# Patient Record
Sex: FEMALE | HISPANIC OR LATINO | Employment: FULL TIME | URBAN - METROPOLITAN AREA
[De-identification: names, ages, dates, MRNs, and addresses within clinical notes are randomized per-mention and may not be internally consistent; named-entity substitution may affect disease eponyms.]

---

## 2018-07-20 ENCOUNTER — OFFICE VISIT (OUTPATIENT)
Dept: FAMILY MEDICINE CLINIC | Facility: CLINIC | Age: 48
End: 2018-07-20

## 2018-07-20 VITALS
DIASTOLIC BLOOD PRESSURE: 80 MMHG | SYSTOLIC BLOOD PRESSURE: 116 MMHG | BODY MASS INDEX: 31.95 KG/M2 | HEART RATE: 76 BPM | HEIGHT: 63 IN | WEIGHT: 180.3 LBS | TEMPERATURE: 98.4 F | OXYGEN SATURATION: 98 %

## 2018-07-20 DIAGNOSIS — Z12.39 BREAST CANCER SCREENING: ICD-10-CM

## 2018-07-20 DIAGNOSIS — Z00.00 ROUTINE GENERAL MEDICAL EXAMINATION AT A HEALTH CARE FACILITY: Primary | ICD-10-CM

## 2018-07-20 DIAGNOSIS — K59.00 CONSTIPATION, UNSPECIFIED CONSTIPATION TYPE: ICD-10-CM

## 2018-07-20 DIAGNOSIS — Z13.1 SCREENING FOR DIABETES MELLITUS: ICD-10-CM

## 2018-07-20 DIAGNOSIS — Z13.6 SCREENING FOR ISCHEMIC HEART DISEASE: ICD-10-CM

## 2018-07-20 PROCEDURE — 99203 OFFICE O/P NEW LOW 30 MIN: CPT | Performed by: NURSE PRACTITIONER

## 2018-07-20 PROCEDURE — 99386 PREV VISIT NEW AGE 40-64: CPT | Performed by: NURSE PRACTITIONER

## 2018-07-20 NOTE — PROGRESS NOTES
"Subjective   Filomena Melendez is a 47 y.o. female.     History of Present Illness   Filomena Melendez 47 y.o. female who presents today for a new patient appointment.    she has a history of   Patient Active Problem List   Diagnosis   • Routine general medical examination at a health care facility   • Breast cancer screening   • Constipation   .  she is here to establish care I reviewed the PFSH recorded today by my MA/LPN staff.   she   She has been feeling well.    Well Adult Physical: Patient here for a comprehensive physical exam.The patient reports constipation  Do you take any herbs or supplements that were not prescribed by a doctor? no Are you taking calcium supplements? no Are you taking aspirin daily? no    Patient has been doing with constipation for a very long time she uses over-the-counter fiber and Metamucil and nothing seems to help.  She has tried to stay away from laxatives.  She usually has a bowel movement every other day but very hard pebbly.    The following portions of the patient's history were reviewed and updated as appropriate: allergies, current medications, past social history and problem list.    Review of Systems   Constitutional: Negative for fever.   All other systems reviewed and are negative.      Objective   /80 (BP Location: Left arm, Patient Position: Sitting)   Pulse 76   Temp 98.4 °F (36.9 °C)   Ht 160 cm (62.99\")   Wt 81.8 kg (180 lb 4.8 oz)   SpO2 98%   BMI 31.95 kg/m²   Physical Exam   Constitutional: She is oriented to person, place, and time. She appears well-developed and well-nourished. No distress.   HENT:   Head: Normocephalic and atraumatic. Hair is normal.   Right Ear: Hearing, tympanic membrane, external ear and ear canal normal. No drainage. No decreased hearing is noted.   Left Ear: Hearing, tympanic membrane, external ear and ear canal normal. No decreased hearing is noted.   Nose: No nasal deformity.   Mouth/Throat: Oropharynx is clear and moist.   Eyes: Pupils " are equal, round, and reactive to light. Conjunctivae, EOM and lids are normal. Right eye exhibits no discharge. Left eye exhibits no discharge.   Neck: Normal range of motion. Neck supple. No JVD present. No tracheal deviation present. No thyromegaly present.   Cardiovascular: Normal rate, regular rhythm, normal heart sounds, intact distal pulses and normal pulses.  Exam reveals no gallop and no friction rub.    No murmur heard.  Pulmonary/Chest: Effort normal and breath sounds normal. No respiratory distress. She has no wheezes. She has no rales. She exhibits no tenderness.   Abdominal: Soft. Bowel sounds are normal. She exhibits no distension and no mass. There is no tenderness. There is no rebound and no guarding. No hernia.   Musculoskeletal: Normal range of motion. She exhibits no edema, tenderness or deformity.   Lymphadenopathy:     She has no cervical adenopathy.   Neurological: She is alert and oriented to person, place, and time. She has normal reflexes. She displays normal reflexes. No cranial nerve deficit. She exhibits normal muscle tone. Coordination normal.   Skin: Skin is warm and dry. No rash noted. She is not diaphoretic. No erythema.   Psychiatric: She has a normal mood and affect. Her behavior is normal. Judgment and thought content normal.   Vitals reviewed.      Assessment/Plan   Problem List Items Addressed This Visit        Digestive    Constipation    Relevant Medications    linaclotide (LINZESS) 72 MCG capsule capsule       Other    Routine general medical examination at a health care facility - Primary    Breast cancer screening    Relevant Orders    Mammo Screening Bilateral With CAD        Discussed weight, diet and exercise  Follow up after labs

## 2018-07-24 LAB
ALBUMIN SERPL-MCNC: 4.3 G/DL (ref 3.5–5.2)
ALBUMIN/GLOB SERPL: 1.5 G/DL
ALP SERPL-CCNC: 141 U/L (ref 39–117)
ALT SERPL-CCNC: 29 U/L (ref 1–33)
AST SERPL-CCNC: 24 U/L (ref 1–32)
BILIRUB SERPL-MCNC: 0.3 MG/DL (ref 0.1–1.2)
BUN SERPL-MCNC: 14 MG/DL (ref 6–20)
BUN/CREAT SERPL: 15.7 (ref 7–25)
CALCIUM SERPL-MCNC: 9.5 MG/DL (ref 8.6–10.5)
CHLORIDE SERPL-SCNC: 106 MMOL/L (ref 98–107)
CHOLEST SERPL-MCNC: 143 MG/DL (ref 0–200)
CO2 SERPL-SCNC: 25.3 MMOL/L (ref 22–29)
CREAT SERPL-MCNC: 0.89 MG/DL (ref 0.57–1)
GLOBULIN SER CALC-MCNC: 2.9 GM/DL
GLUCOSE SERPL-MCNC: 100 MG/DL (ref 65–99)
HDLC SERPL-MCNC: 46 MG/DL (ref 40–60)
LDLC SERPL CALC-MCNC: 82 MG/DL (ref 0–100)
LDLC/HDLC SERPL: 1.79 {RATIO}
POTASSIUM SERPL-SCNC: 4.5 MMOL/L (ref 3.5–5.2)
PROT SERPL-MCNC: 7.2 G/DL (ref 6–8.5)
SODIUM SERPL-SCNC: 142 MMOL/L (ref 136–145)
TRIGL SERPL-MCNC: 74 MG/DL (ref 0–150)
VLDLC SERPL CALC-MCNC: 14.8 MG/DL (ref 5–40)

## 2018-07-30 ENCOUNTER — HOSPITAL ENCOUNTER (OUTPATIENT)
Dept: MAMMOGRAPHY | Facility: HOSPITAL | Age: 48
Discharge: HOME | End: 2018-07-30
Admitting: NURSE PRACTITIONER

## 2018-07-30 PROCEDURE — 77067 SCR MAMMO BI INCL CAD: CPT

## 2021-09-23 ENCOUNTER — OFFICE VISIT (OUTPATIENT)
Dept: FAMILY MEDICINE CLINIC | Facility: CLINIC | Age: 51
End: 2021-09-23

## 2021-09-23 VITALS
SYSTOLIC BLOOD PRESSURE: 130 MMHG | WEIGHT: 177 LBS | BODY MASS INDEX: 30.22 KG/M2 | DIASTOLIC BLOOD PRESSURE: 80 MMHG | HEIGHT: 64 IN | OXYGEN SATURATION: 98 % | TEMPERATURE: 98 F | HEART RATE: 80 BPM

## 2021-09-23 DIAGNOSIS — Z12.31 ENCOUNTER FOR SCREENING MAMMOGRAM FOR MALIGNANT NEOPLASM OF BREAST: ICD-10-CM

## 2021-09-23 DIAGNOSIS — Z12.11 COLON CANCER SCREENING: ICD-10-CM

## 2021-09-23 DIAGNOSIS — Z13.0 SCREENING FOR IRON DEFICIENCY ANEMIA: ICD-10-CM

## 2021-09-23 DIAGNOSIS — Z00.00 ROUTINE GENERAL MEDICAL EXAMINATION AT A HEALTH CARE FACILITY: Primary | ICD-10-CM

## 2021-09-23 DIAGNOSIS — Z13.6 SCREENING FOR ISCHEMIC HEART DISEASE: ICD-10-CM

## 2021-09-23 DIAGNOSIS — Z13.1 SCREENING FOR DIABETES MELLITUS: ICD-10-CM

## 2021-09-23 LAB
ALBUMIN SERPL-MCNC: 4.5 G/DL (ref 3.5–5.2)
ALBUMIN/GLOB SERPL: 1.5 G/DL
ALP SERPL-CCNC: 158 U/L (ref 39–117)
ALT SERPL-CCNC: 42 U/L (ref 1–33)
AST SERPL-CCNC: 38 U/L (ref 1–32)
BASOPHILS # BLD AUTO: 0.05 10*3/MM3 (ref 0–0.2)
BASOPHILS NFR BLD AUTO: 0.8 % (ref 0–1.5)
BILIRUB SERPL-MCNC: 0.2 MG/DL (ref 0–1.2)
BUN SERPL-MCNC: 10 MG/DL (ref 6–20)
BUN/CREAT SERPL: 13.2 (ref 7–25)
CALCIUM SERPL-MCNC: 9.5 MG/DL (ref 8.6–10.5)
CHLORIDE SERPL-SCNC: 107 MMOL/L (ref 98–107)
CHOLEST SERPL-MCNC: 181 MG/DL (ref 0–200)
CO2 SERPL-SCNC: 26.7 MMOL/L (ref 22–29)
CREAT SERPL-MCNC: 0.76 MG/DL (ref 0.57–1)
EOSINOPHIL # BLD AUTO: 0.16 10*3/MM3 (ref 0–0.4)
EOSINOPHIL NFR BLD AUTO: 2.5 % (ref 0.3–6.2)
ERYTHROCYTE [DISTWIDTH] IN BLOOD BY AUTOMATED COUNT: 13.3 % (ref 12.3–15.4)
GLOBULIN SER CALC-MCNC: 3.1 GM/DL
GLUCOSE SERPL-MCNC: 96 MG/DL (ref 65–99)
HCT VFR BLD AUTO: 40.8 % (ref 34–46.6)
HDLC SERPL-MCNC: 54 MG/DL (ref 40–60)
HGB BLD-MCNC: 13 G/DL (ref 12–15.9)
IMM GRANULOCYTES # BLD AUTO: 0.02 10*3/MM3 (ref 0–0.05)
IMM GRANULOCYTES NFR BLD AUTO: 0.3 % (ref 0–0.5)
LDLC SERPL CALC-MCNC: 108 MG/DL (ref 0–100)
LDLC/HDLC SERPL: 1.95 {RATIO}
LYMPHOCYTES # BLD AUTO: 1.81 10*3/MM3 (ref 0.7–3.1)
LYMPHOCYTES NFR BLD AUTO: 28.5 % (ref 19.6–45.3)
MCH RBC QN AUTO: 27.3 PG (ref 26.6–33)
MCHC RBC AUTO-ENTMCNC: 31.9 G/DL (ref 31.5–35.7)
MCV RBC AUTO: 85.7 FL (ref 79–97)
MONOCYTES # BLD AUTO: 0.49 10*3/MM3 (ref 0.1–0.9)
MONOCYTES NFR BLD AUTO: 7.7 % (ref 5–12)
NEUTROPHILS # BLD AUTO: 3.81 10*3/MM3 (ref 1.7–7)
NEUTROPHILS NFR BLD AUTO: 60.2 % (ref 42.7–76)
NRBC BLD AUTO-RTO: 0 /100 WBC (ref 0–0.2)
PLATELET # BLD AUTO: 304 10*3/MM3 (ref 140–450)
POTASSIUM SERPL-SCNC: 4.4 MMOL/L (ref 3.5–5.2)
PROT SERPL-MCNC: 7.6 G/DL (ref 6–8.5)
RBC # BLD AUTO: 4.76 10*6/MM3 (ref 3.77–5.28)
SODIUM SERPL-SCNC: 141 MMOL/L (ref 136–145)
TRIGL SERPL-MCNC: 108 MG/DL (ref 0–150)
VLDLC SERPL CALC-MCNC: 19 MG/DL (ref 5–40)
WBC # BLD AUTO: 6.34 10*3/MM3 (ref 3.4–10.8)

## 2021-09-23 PROCEDURE — 99396 PREV VISIT EST AGE 40-64: CPT | Performed by: NURSE PRACTITIONER

## 2021-09-23 NOTE — PROGRESS NOTES
"Chief Complaint  Annual Exam (Patient is fasting. Patient is needing order for mammogram ( wore mask and goggles))    Odilon Melendez presents to [facility]  History of Present Illness  Well Adult Physical: Patient here for a comprehensive physical exam.The patient reports no problems  Do you take any herbs or supplements that were not prescribed by a doctor? no Are you taking calcium supplements? no Are you taking aspirin daily? no      Objective   Vital Signs:   /80   Pulse 80   Temp 98 °F (36.7 °C)   Ht 162.6 cm (64\")   Wt 80.3 kg (177 lb)   SpO2 98%   BMI 30.38 kg/m²     Physical Exam  Vitals reviewed.   Constitutional:       General: She is not in acute distress.     Appearance: She is well-developed. She is not diaphoretic.   HENT:      Head: Normocephalic and atraumatic. Hair is normal.      Right Ear: Hearing, tympanic membrane, ear canal and external ear normal. No decreased hearing noted. No drainage.      Left Ear: Hearing, tympanic membrane, ear canal and external ear normal. No decreased hearing noted.      Nose: No nasal deformity.   Eyes:      General: Lids are normal.         Right eye: No discharge.         Left eye: No discharge.      Conjunctiva/sclera: Conjunctivae normal.      Pupils: Pupils are equal, round, and reactive to light.   Neck:      Thyroid: No thyromegaly.      Vascular: No JVD.      Trachea: No tracheal deviation.   Cardiovascular:      Rate and Rhythm: Normal rate and regular rhythm.      Pulses: Normal pulses.      Heart sounds: Normal heart sounds. No murmur heard.   No friction rub. No gallop.    Pulmonary:      Effort: Pulmonary effort is normal. No respiratory distress.      Breath sounds: Normal breath sounds. No wheezing or rales.   Chest:      Chest wall: No tenderness.   Abdominal:      General: Bowel sounds are normal. There is no distension.      Palpations: Abdomen is soft. There is no mass.      Tenderness: " There is no abdominal tenderness. There is no guarding or rebound.      Hernia: No hernia is present.   Musculoskeletal:         General: No tenderness or deformity. Normal range of motion.      Cervical back: Normal range of motion and neck supple.   Lymphadenopathy:      Cervical: No cervical adenopathy.   Skin:     General: Skin is warm and dry.      Findings: No erythema or rash.   Neurological:      Mental Status: She is alert and oriented to person, place, and time.      Cranial Nerves: No cranial nerve deficit.      Motor: No abnormal muscle tone.      Coordination: Coordination normal.      Deep Tendon Reflexes: Reflexes are normal and symmetric. Reflexes normal.   Psychiatric:         Behavior: Behavior normal.         Thought Content: Thought content normal.         Judgment: Judgment normal.        Result Review :   The following data was reviewed by: JESSICA Mijares on 09/23/2021:                              Assessment and Plan    Diagnoses and all orders for this visit:    1. Routine general medical examination at a health care facility (Primary)    2. Screening for iron deficiency anemia  -     CBC & Differential    3. Screening for diabetes mellitus  -     Comprehensive Metabolic Panel    4. Screening for ischemic heart disease  -     Lipid Panel With LDL / HDL Ratio    5. Encounter for screening mammogram for malignant neoplasm of breast  -     Mammo Screening Bilateral With CAD    6. Colon cancer screening  -     Cologuard - Stool, Per Rectum; Future        Follow Up   Return if symptoms worsen or fail to improve.  Patient was given instructions and counseling regarding her condition or for health maintenance advice. Please see specific information pulled into the AVS if appropriate.     Discussed weight, diet and exercise  Follow up after labs    Mask and googles worn

## 2021-09-24 ENCOUNTER — TRANSCRIBE ORDERS (OUTPATIENT)
Dept: ADMINISTRATIVE | Facility: HOSPITAL | Age: 51
End: 2021-09-24

## 2021-09-24 DIAGNOSIS — Z12.31 SCREENING MAMMOGRAM, ENCOUNTER FOR: Primary | ICD-10-CM

## 2021-09-24 DIAGNOSIS — R79.89 ELEVATED LFTS: Primary | ICD-10-CM

## 2021-10-08 DIAGNOSIS — R79.89 ELEVATED LFTS: ICD-10-CM

## 2021-10-14 LAB
ALBUMIN SERPL-MCNC: 4.6 G/DL (ref 3.5–5.2)
ALBUMIN/GLOB SERPL: 1.5 G/DL
ALP SERPL-CCNC: 145 U/L (ref 39–117)
ALT SERPL-CCNC: 22 U/L (ref 1–33)
AST SERPL-CCNC: 25 U/L (ref 1–32)
BILIRUB SERPL-MCNC: 0.3 MG/DL (ref 0–1.2)
BUN SERPL-MCNC: 11 MG/DL (ref 6–20)
BUN/CREAT SERPL: 13.6 (ref 7–25)
CALCIUM SERPL-MCNC: 9.6 MG/DL (ref 8.6–10.5)
CHLORIDE SERPL-SCNC: 105 MMOL/L (ref 98–107)
CO2 SERPL-SCNC: 26.1 MMOL/L (ref 22–29)
CREAT SERPL-MCNC: 0.81 MG/DL (ref 0.57–1)
GLOBULIN SER CALC-MCNC: 3.1 GM/DL
GLUCOSE SERPL-MCNC: 90 MG/DL (ref 65–99)
HAV IGM SERPL QL IA: NEGATIVE
HBV CORE IGM SERPL QL IA: NEGATIVE
HBV SURFACE AG SERPL QL IA: NEGATIVE
HCV AB S/CO SERPL IA: 0.1 S/CO RATIO (ref 0–0.9)
POTASSIUM SERPL-SCNC: 4.2 MMOL/L (ref 3.5–5.2)
PROT SERPL-MCNC: 7.7 G/DL (ref 6–8.5)
SODIUM SERPL-SCNC: 140 MMOL/L (ref 136–145)

## 2022-09-27 ENCOUNTER — OFFICE VISIT (OUTPATIENT)
Dept: FAMILY MEDICINE CLINIC | Facility: CLINIC | Age: 52
End: 2022-09-27

## 2022-09-27 VITALS
OXYGEN SATURATION: 98 % | RESPIRATION RATE: 16 BRPM | HEIGHT: 64 IN | DIASTOLIC BLOOD PRESSURE: 100 MMHG | HEART RATE: 70 BPM | TEMPERATURE: 97.1 F | WEIGHT: 189.8 LBS | SYSTOLIC BLOOD PRESSURE: 122 MMHG | BODY MASS INDEX: 32.4 KG/M2

## 2022-09-27 DIAGNOSIS — E66.09 CLASS 1 OBESITY DUE TO EXCESS CALORIES WITH SERIOUS COMORBIDITY AND BODY MASS INDEX (BMI) OF 32.0 TO 32.9 IN ADULT: ICD-10-CM

## 2022-09-27 DIAGNOSIS — E78.5 DYSLIPIDEMIA: ICD-10-CM

## 2022-09-27 DIAGNOSIS — Z23 NEED FOR TDAP VACCINATION: ICD-10-CM

## 2022-09-27 DIAGNOSIS — Z00.00 ENCOUNTER FOR ANNUAL PHYSICAL EXAM: Primary | ICD-10-CM

## 2022-09-27 DIAGNOSIS — R03.0 ELEVATED BLOOD-PRESSURE READING WITHOUT DIAGNOSIS OF HYPERTENSION: ICD-10-CM

## 2022-09-27 DIAGNOSIS — R74.8 ALKALINE PHOSPHATASE ELEVATION: ICD-10-CM

## 2022-09-27 DIAGNOSIS — Z12.11 SCREENING FOR COLON CANCER: ICD-10-CM

## 2022-09-27 DIAGNOSIS — R68.89 COLD INTOLERANCE: ICD-10-CM

## 2022-09-27 LAB
BILIRUB BLD-MCNC: NEGATIVE MG/DL
CLARITY, POC: CLEAR
COLOR UR: YELLOW
EXPIRATION DATE: ABNORMAL
GLUCOSE UR STRIP-MCNC: NEGATIVE MG/DL
KETONES UR QL: NEGATIVE
LEUKOCYTE EST, POC: ABNORMAL
Lab: ABNORMAL
NITRITE UR-MCNC: NEGATIVE MG/ML
PH UR: 6.5 [PH] (ref 5–8)
PROT UR STRIP-MCNC: NEGATIVE MG/DL
RBC # UR STRIP: NEGATIVE /UL
SP GR UR: 1.02 (ref 1–1.03)
UROBILINOGEN UR QL: ABNORMAL

## 2022-09-27 PROCEDURE — 90715 TDAP VACCINE 7 YRS/> IM: CPT | Performed by: FAMILY MEDICINE

## 2022-09-27 PROCEDURE — 99214 OFFICE O/P EST MOD 30 MIN: CPT | Performed by: FAMILY MEDICINE

## 2022-09-27 PROCEDURE — 90471 IMMUNIZATION ADMIN: CPT | Performed by: FAMILY MEDICINE

## 2022-09-27 PROCEDURE — 99396 PREV VISIT EST AGE 40-64: CPT | Performed by: FAMILY MEDICINE

## 2022-09-27 PROCEDURE — 81003 URINALYSIS AUTO W/O SCOPE: CPT | Performed by: FAMILY MEDICINE

## 2022-09-27 RX ORDER — NAPROXEN SODIUM 220 MG
220 TABLET ORAL 2 TIMES DAILY PRN
COMMUNITY
End: 2023-04-04

## 2022-09-27 NOTE — PROGRESS NOTES
Patient Care Team:  Brian Durham MD as PCP - General (Urgent Care)     Chief complaint: Patient is in today for a physical          Patient is a 52 y.o. female who presents for her yearly physical exam.     HPI      Doing well, eats HD,trys to do ex  Scheduled for Mammogram and PAP  Her BP elveated , rechecked better but still above goal 122/98. No h/o HTN  No h/o thyroid dis , has cold intolerance   Labs been reviewed showed mild elevation in LDL and Alk phos.  she eats HD and tries to lose wt. She works from home , not doing much excer  Dose not drinks alcohol nor smoking     Lab Results   Component Value Date    GLUCOSE 90 10/13/2021    BUN 11 10/13/2021    CREATININE 0.81 10/13/2021    EGFRIFNONA 75 10/13/2021    EGFRIFAFRI 91 10/13/2021    BCR 13.6 10/13/2021    K 4.2 10/13/2021    CO2 26.1 10/13/2021    CALCIUM 9.6 10/13/2021    PROTENTOTREF 7.7 10/13/2021    ALBUMIN 4.60 10/13/2021    LABIL2 1.5 10/13/2021    AST 25 10/13/2021    ALT 22 10/13/2021     Lab Results   Component Value Date    CHLPL 181 09/23/2021    TRIG 108 09/23/2021    HDL 54 09/23/2021     (H) 09/23/2021         Health maintenance/lifestyle:  Immunization History   Administered Date(s) Administered   • Tdap 09/27/2022     ·  vaccine series and plans on getting the booster shot       PHQ-2 Depression Screening  Little interest or pleasure in doing things? 0-->not at all   Feeling down, depressed, or hopeless? 0-->not at all   PHQ-2 Total Score 0         Social History     Tobacco Use   Smoking Status Never Smoker   Smokeless Tobacco Never Used     Social History     Substance and Sexual Activity   Alcohol Use No         Review of Systems   Constitutional: Negative for activity change, appetite change and diaphoresis.   Respiratory: Negative for cough, choking and shortness of breath.    Cardiovascular: Negative for chest pain, palpitations and leg swelling.   Gastrointestinal: Negative for abdominal pain.   Endocrine: Positive for  "cold intolerance.         History  Past Medical History:   Diagnosis Date   • Allergic    • Arthritis 2015      Past Surgical History:   Procedure Laterality Date   • ABDOMINAL SURGERY     • COSMETIC SURGERY  2014    Tummy Tuck   • FOOT SURGERY     • TUBAL ABDOMINAL LIGATION        Allergies   Allergen Reactions   • Penicillins Rash      Family History   Problem Relation Age of Onset   • Heart disease Mother    • Arthritis Mother    • Asthma Mother    • Depression Mother    • Diabetes Mother    • Hyperlipidemia Mother    • Stroke Mother    • Cancer Maternal Aunt         breast cancer      Social History     Socioeconomic History   • Marital status:    Tobacco Use   • Smoking status: Never Smoker   • Smokeless tobacco: Never Used   Substance and Sexual Activity   • Alcohol use: No   • Drug use: Never   • Sexual activity: Yes     Partners: Male     Birth control/protection: None        Current Outpatient Medications:   •  naproxen sodium (ALEVE) 220 MG tablet, Take 220 mg by mouth 2 (Two) Times a Day As Needed., Disp: , Rfl:                   /100   Pulse 70   Temp 97.1 °F (36.2 °C)   Resp 16   Ht 162.6 cm (64\")   Wt 86.1 kg (189 lb 12.8 oz)   SpO2 98%   BMI 32.58 kg/m²       Physical Exam  Vitals and nursing note reviewed.   Constitutional:       General: She is not in acute distress.     Appearance: She is well-developed. She is obese. She is not ill-appearing, toxic-appearing or diaphoretic.   HENT:      Head: Normocephalic.      Right Ear: Tympanic membrane, ear canal and external ear normal.      Left Ear: Tympanic membrane, ear canal and external ear normal.      Nose: No congestion or rhinorrhea.      Mouth/Throat:      Mouth: Mucous membranes are moist.      Pharynx: Oropharynx is clear. No oropharyngeal exudate or posterior oropharyngeal erythema.   Eyes:      General: No scleral icterus.        Right eye: No discharge.         Left eye: No discharge.      Extraocular Movements: " Extraocular movements intact.      Conjunctiva/sclera: Conjunctivae normal.      Pupils: Pupils are equal, round, and reactive to light.   Neck:      Thyroid: No thyromegaly.      Comments: No enlarged thyroid    Cardiovascular:      Rate and Rhythm: Normal rate and regular rhythm.      Heart sounds: Normal heart sounds. No murmur heard.    No friction rub. No gallop.   Pulmonary:      Effort: Pulmonary effort is normal. No respiratory distress.      Breath sounds: Normal breath sounds. No stridor. No wheezing, rhonchi or rales.   Abdominal:      General: Bowel sounds are normal. There is no distension.      Palpations: Abdomen is soft. There is no mass.      Tenderness: There is no abdominal tenderness. There is no guarding or rebound.      Hernia: No hernia is present.   Musculoskeletal:         General: Normal range of motion.      Cervical back: Neck supple.   Skin:     General: Skin is warm.      Findings: No rash.   Neurological:      General: No focal deficit present.      Mental Status: She is alert and oriented to person, place, and time.      Cranial Nerves: No cranial nerve deficit.      Sensory: No sensory deficit.      Motor: No weakness or abnormal muscle tone.      Coordination: Coordination normal.      Gait: Gait normal.      Deep Tendon Reflexes: Reflexes normal.   Psychiatric:         Mood and Affect: Mood normal.         Behavior: Behavior normal.         Thought Content: Thought content normal.         Judgment: Judgment normal.                   Diagnoses and all orders for this visit:    1. Encounter for annual physical exam (Primary)  -     POC Urinalysis Dipstick, Automated  -     CBC (No Diff)  -     Comprehensive Metabolic Panel    2. Screening for colon cancer  -     Cologuard - Stool, Per Rectum; Future    3. Dyslipidemia  -     Lipid Panel  Encouraged patient to maintain a low cholesterol/DASH diet, increase aerobic exercise as tolerated, decrease alcohol, stop smoking if applicable,  increase fiber intake, limit sodium intake to no more than 1,500mg/day, increase omega-3 fatty acids, and maintain medication compliance.     4. Cold intolerance  -     TSH Rfx On Abnormal To Free T4      5. Elevated blood-pressure reading without diagnosis of hypertension;  - New problem  - Close BP monitoring and f/u , BP login form provided   Encouraged patient to maintain a heart healthy diet/DASH diet, exercise as tolerated, limit sodium intake to no more than 1,500mg/day, limit alcohol intake, maintain medication compliance and practice relaxation techniques to reduce stress.      6. Alkaline phosphatase elevation;  - Elevated Since 2018    - Will recheck it today, if still elevated will refer to GI  For further evaluation     7. Obesity with BMI 32.6;  - Patient's (Body mass index is 32.58 kg/m².) indicates that they are obese (BMI >30) with health related conditions that include hypertension and dyslipidemias . Weight is newly identified. BMI is is above average; no BMI management plan is appropriate. We discussed portion control and increasing exercise.       8. Need for Tdap vaccination  -     Tdap Vaccine Greater Than or Equal To 8yo IM    Addendum 9/28;  Labs reviewed showed ;  1.mild elevation in TSH , will closely monitor it, will recheck it next OV  2. Still elevated alk phos, referral to GI done     Lab Results   Component Value Date    TSH 4.270 (H) 09/27/2022     Lab Results   Component Value Date    ALKPHOS 150 (H) 09/27/2022           Discussed with pt; Regular exercise, healthy diet. Calcium intake, Sunscreen use encouraged.     shingrix discussed -  can check at pharmacy since we do not have     Follow up: Return in about 4 weeks (around 10/25/2022) for follow up on current illness.  Plan of care discussed with pt. They verbalized understanding and agreement.     Brian Durham MD   9/27/2022   10:45 EDT

## 2022-09-28 LAB
ALBUMIN SERPL-MCNC: 4.4 G/DL (ref 3.5–5.2)
ALBUMIN/GLOB SERPL: 1.5 G/DL
ALP SERPL-CCNC: 150 U/L (ref 39–117)
ALT SERPL-CCNC: 23 U/L (ref 1–33)
AST SERPL-CCNC: 27 U/L (ref 1–32)
BILIRUB SERPL-MCNC: 0.2 MG/DL (ref 0–1.2)
BUN SERPL-MCNC: 11 MG/DL (ref 6–20)
BUN/CREAT SERPL: 13.8 (ref 7–25)
CALCIUM SERPL-MCNC: 9.1 MG/DL (ref 8.6–10.5)
CHLORIDE SERPL-SCNC: 105 MMOL/L (ref 98–107)
CHOLEST SERPL-MCNC: 184 MG/DL (ref 0–200)
CO2 SERPL-SCNC: 26.7 MMOL/L (ref 22–29)
CREAT SERPL-MCNC: 0.8 MG/DL (ref 0.57–1)
EGFRCR SERPLBLD CKD-EPI 2021: 89.3 ML/MIN/1.73
ERYTHROCYTE [DISTWIDTH] IN BLOOD BY AUTOMATED COUNT: 13.4 % (ref 12.3–15.4)
GLOBULIN SER CALC-MCNC: 3 GM/DL
GLUCOSE SERPL-MCNC: 93 MG/DL (ref 65–99)
HCT VFR BLD AUTO: 39.2 % (ref 34–46.6)
HDLC SERPL-MCNC: 54 MG/DL (ref 40–60)
HGB BLD-MCNC: 12.5 G/DL (ref 12–15.9)
LDLC SERPL CALC-MCNC: 110 MG/DL (ref 0–100)
MCH RBC QN AUTO: 26.8 PG (ref 26.6–33)
MCHC RBC AUTO-ENTMCNC: 31.9 G/DL (ref 31.5–35.7)
MCV RBC AUTO: 83.9 FL (ref 79–97)
PLATELET # BLD AUTO: 308 10*3/MM3 (ref 140–450)
POTASSIUM SERPL-SCNC: 4.3 MMOL/L (ref 3.5–5.2)
PROT SERPL-MCNC: 7.4 G/DL (ref 6–8.5)
RBC # BLD AUTO: 4.67 10*6/MM3 (ref 3.77–5.28)
SODIUM SERPL-SCNC: 142 MMOL/L (ref 136–145)
TRIGL SERPL-MCNC: 110 MG/DL (ref 0–150)
TSH SERPL DL<=0.005 MIU/L-ACNC: 4.27 UIU/ML (ref 0.27–4.2)
VLDLC SERPL CALC-MCNC: 20 MG/DL (ref 5–40)
WBC # BLD AUTO: 6.35 10*3/MM3 (ref 3.4–10.8)

## 2022-10-17 ENCOUNTER — OFFICE VISIT (OUTPATIENT)
Dept: GASTROENTEROLOGY | Facility: CLINIC | Age: 52
End: 2022-10-17

## 2022-10-17 VITALS
SYSTOLIC BLOOD PRESSURE: 129 MMHG | HEIGHT: 64 IN | HEART RATE: 83 BPM | BODY MASS INDEX: 32.62 KG/M2 | DIASTOLIC BLOOD PRESSURE: 85 MMHG | TEMPERATURE: 95 F | WEIGHT: 191.1 LBS

## 2022-10-17 DIAGNOSIS — R74.8 ELEVATED ALKALINE PHOSPHATASE LEVEL: Primary | ICD-10-CM

## 2022-10-17 PROCEDURE — 99203 OFFICE O/P NEW LOW 30 MIN: CPT | Performed by: INTERNAL MEDICINE

## 2022-10-17 NOTE — PROGRESS NOTES
Chief Complaint   Patient presents with   • elevated alkaline phosphtase     Subjective   HPI  Filomena Melendez is a 52 y.o. female who presents today for new patient evaluation.  She is been referred for evaluation of an elevated alkaline phosphatase.  This is peers to be stable dating back to at least 2018.  She is typically had normal AST and ALT she did have a very mild elevation previously but was normal on most recent check.  Total bilirubin is normal.  BMI today is 32.8 she reports some gradual weight gain over the last few years.  She has no family history of liver disease.  She still has an intact gallbladder and has no complaints of biliary colic.  She does not consume alcohol.  Her primary care physician is ordered a Cologuard which the patient has the kit at home but has not yet completed.    Objective   Vitals:    10/17/22 1323   BP: 129/85   Pulse: 83   Temp: 95 °F (35 °C)     Physical Exam  Vitals reviewed.   Constitutional:       Appearance: She is well-developed.   HENT:      Head: Normocephalic and atraumatic.   Abdominal:      General: Bowel sounds are normal. There is no distension.      Palpations: Abdomen is soft. There is no mass.      Tenderness: There is no abdominal tenderness.      Hernia: No hernia is present.   Skin:     General: Skin is warm and dry.   Neurological:      Mental Status: She is alert and oriented to person, place, and time.   Psychiatric:         Behavior: Behavior normal.         Thought Content: Thought content normal.         Judgment: Judgment normal.              Assessment & Plan   Assessment:     1. Elevated alkaline phosphatase level      Plan:   52-year-old female with longstanding isolated elevation of alkaline phosphatase could possibly be related to an element of NAFLD.  We will refer her for an ultrasound of the liver we will check fractionated alkaline phosphatase and AMA today.            Jay Olsen M.D.  [facility]  [address] [facility] [place] [address]  Office: [phone]

## 2022-10-18 LAB
ALP BONE CFR SERPL: 42 % (ref 14–68)
ALP INTEST CFR SERPL: 17 % (ref 0–18)
ALP LIVER CFR SERPL: 41 % (ref 18–85)
ALP SERPL-CCNC: 163 IU/L (ref 44–121)
MITOCHONDRIA M2 IGG SER-ACNC: <20 UNITS (ref 0–20)

## 2022-10-19 ENCOUNTER — OFFICE VISIT (OUTPATIENT)
Dept: OBSTETRICS AND GYNECOLOGY | Facility: CLINIC | Age: 52
End: 2022-10-19

## 2022-10-19 ENCOUNTER — PROCEDURE VISIT (OUTPATIENT)
Dept: OBSTETRICS AND GYNECOLOGY | Facility: CLINIC | Age: 52
End: 2022-10-19

## 2022-10-19 ENCOUNTER — APPOINTMENT (OUTPATIENT)
Dept: WOMENS IMAGING | Facility: HOSPITAL | Age: 52
End: 2022-10-19

## 2022-10-19 VITALS
SYSTOLIC BLOOD PRESSURE: 132 MMHG | HEIGHT: 64 IN | BODY MASS INDEX: 32.54 KG/M2 | WEIGHT: 190.6 LBS | DIASTOLIC BLOOD PRESSURE: 84 MMHG

## 2022-10-19 DIAGNOSIS — N91.2 AMENORRHEA: ICD-10-CM

## 2022-10-19 DIAGNOSIS — Z01.419 ENCOUNTER FOR GYNECOLOGICAL EXAMINATION WITHOUT ABNORMAL FINDING: Primary | ICD-10-CM

## 2022-10-19 DIAGNOSIS — Z12.31 VISIT FOR SCREENING MAMMOGRAM: Primary | ICD-10-CM

## 2022-10-19 PROCEDURE — 77063 BREAST TOMOSYNTHESIS BI: CPT | Performed by: RADIOLOGY

## 2022-10-19 PROCEDURE — 77067 SCR MAMMO BI INCL CAD: CPT | Performed by: RADIOLOGY

## 2022-10-19 PROCEDURE — 77063 BREAST TOMOSYNTHESIS BI: CPT | Performed by: OBSTETRICS & GYNECOLOGY

## 2022-10-19 PROCEDURE — 99386 PREV VISIT NEW AGE 40-64: CPT | Performed by: OBSTETRICS & GYNECOLOGY

## 2022-10-19 PROCEDURE — 77067 SCR MAMMO BI INCL CAD: CPT | Performed by: OBSTETRICS & GYNECOLOGY

## 2022-10-19 NOTE — PROGRESS NOTES
GYN Annual Exam     CC- Here for annual exam.     Filomena Melendez is a 52 y.o. female who presents for annual well woman exam. Periods are rare, lasting 5 days. Dysmenorrhea:none. Cyclic symptoms include none. No intermenstrual bleeding, spotting, or discharge.  She states that her  passed in  and not long after that she only has maybe 1 menses a year.  She has never had a work-up related to this.  She does report some mild visual changes recently.    OB History        4    Para   4    Term   4            AB        Living           SAB        IAB        Ectopic        Molar        Multiple        Live Births                    Current contraception: tubal ligation  History of abnormal Pap smear: no  Family history of uterine, colon or ovarian cancer: no  History of abnormal mammogram: no  Family history of breast cancer: yes - Paternal aunt  Last Pap : 6 years ago    Past Medical History:   Diagnosis Date   • Allergic    • Arthritis    • Gestational hypertension    • Hyperlipidemia 2022   • Hypertension 2022   • Lactose intolerance    • Migraine    • Varicella        Past Surgical History:   Procedure Laterality Date   • ABDOMINAL SURGERY     • COSMETIC SURGERY      Tummy Tuck   • FOOT SURGERY     • TUBAL ABDOMINAL LIGATION     • WISDOM TOOTH EXTRACTION           Current Outpatient Medications:   •  naproxen sodium (ALEVE) 220 MG tablet, Take 1 tablet by mouth 2 (Two) Times a Day As Needed. Prn, Disp: , Rfl:     Allergies   Allergen Reactions   • Penicillins Rash       Social History     Tobacco Use   • Smoking status: Never   • Smokeless tobacco: Never   Vaping Use   • Vaping Use: Never used   Substance Use Topics   • Alcohol use: No   • Drug use: Never       Family History   Problem Relation Age of Onset   • Heart disease Mother    • Arthritis Mother    • Asthma Mother    • Depression Mother    • Diabetes Mother    • Hyperlipidemia Mother    • Stroke Mother    • Osteoporosis  "Mother    • Hypertension Mother    • Cancer Maternal Aunt         breast cancer    • Breast cancer Maternal Aunt        Review of Systems   Constitutional: Negative for fatigue and fever.   Eyes: Positive for visual disturbance.   Respiratory: Negative for cough.    Genitourinary: Positive for menstrual problem. Negative for decreased urine volume, pelvic pain and urinary incontinence.       /84   Ht 162.6 cm (64\")   Wt 86.5 kg (190 lb 9.6 oz)   BMI 32.72 kg/m²     Physical Exam  Genitourinary:      Bladder and urethral meatus normal.      No lesions in the vagina.      Right Labia: No lesions.     Left Labia: No lesions.     No vaginal discharge or bleeding.      No vaginal prolapse present.     No vaginal atrophy present.       Right Adnexa: not tender, not full and no mass present.     Left Adnexa: not tender, not full and no mass present.     No cervical motion tenderness, discharge or lesion.      Uterus is not enlarged, fixed or tender.      No uterine mass detected.     Uterus is midaxial.   Breasts:     Right: No mass, nipple discharge, skin change or tenderness.      Left: No mass, nipple discharge, skin change or tenderness.   Neck:      Thyroid: No thyroid mass or thyromegaly.   Abdominal:      General: Abdomen is flat.      Palpations: Abdomen is soft. There is no mass.      Tenderness: There is no abdominal tenderness.   Neurological:      Mental Status: She is alert.   Vitals reviewed.               Assessment     1) GYN annual well woman exam.   2) amenorrhea.  Will check FSH, LH, prolactin and TSH.  Will notify patient with results.     Plan     1) Breast Health - Clinical breast exam yearly, Discussed American cancer society recommendations for breast cancer screening, and Self breast awareness monthly  2) Pap -done today with high risk HPV  3) Smoking status-negative  4) Encouraged to be wary of information obtained via social media and internet based on source and search.  5) Follow up " prn and one year.       Matthieu Miller MD   10/19/2022  11:23 EDT

## 2022-10-20 ENCOUNTER — HOSPITAL ENCOUNTER (OUTPATIENT)
Dept: ULTRASOUND IMAGING | Facility: HOSPITAL | Age: 52
Discharge: HOME | End: 2022-10-20
Admitting: INTERNAL MEDICINE

## 2022-10-20 DIAGNOSIS — R74.8 ELEVATED ALKALINE PHOSPHATASE LEVEL: ICD-10-CM

## 2022-10-20 LAB
FSH SERPL-ACNC: 55 MIU/ML
LH SERPL-ACNC: 38 MIU/ML
PROLACTIN SERPL-MCNC: 7.5 NG/ML (ref 4.8–23.3)
T4 FREE SERPL-MCNC: 0.71 NG/DL (ref 0.93–1.7)
TSH SERPL DL<=0.005 MIU/L-ACNC: 7.92 UIU/ML (ref 0.27–4.2)

## 2022-10-20 PROCEDURE — 76705 ECHO EXAM OF ABDOMEN: CPT

## 2022-10-21 ENCOUNTER — PATIENT MESSAGE (OUTPATIENT)
Dept: OBSTETRICS AND GYNECOLOGY | Facility: CLINIC | Age: 52
End: 2022-10-21

## 2022-10-21 ENCOUNTER — PATIENT ROUNDING (BHMG ONLY) (OUTPATIENT)
Dept: OBSTETRICS AND GYNECOLOGY | Facility: CLINIC | Age: 52
End: 2022-10-21

## 2022-10-21 NOTE — PROGRESS NOTES
My chart message has been sent to the patient for PATIENT ROUNDING with [facility].

## 2022-10-25 LAB
CYTOLOGIST CVX/VAG CYTO: NORMAL
CYTOLOGY CVX/VAG DOC CYTO: NORMAL
CYTOLOGY CVX/VAG DOC THIN PREP: NORMAL
DX ICD CODE: NORMAL
HIV 1 & 2 AB SER-IMP: NORMAL
HPV I/H RISK 4 DNA CVX QL PROBE+SIG AMP: NEGATIVE
OTHER STN SPEC: NORMAL
STAT OF ADQ CVX/VAG CYTO-IMP: NORMAL

## 2022-10-26 ENCOUNTER — OFFICE VISIT (OUTPATIENT)
Dept: FAMILY MEDICINE CLINIC | Facility: CLINIC | Age: 52
End: 2022-10-26

## 2022-10-26 VITALS
WEIGHT: 191 LBS | HEART RATE: 86 BPM | TEMPERATURE: 97.1 F | BODY MASS INDEX: 32.61 KG/M2 | SYSTOLIC BLOOD PRESSURE: 118 MMHG | HEIGHT: 64 IN | OXYGEN SATURATION: 99 % | RESPIRATION RATE: 16 BRPM | DIASTOLIC BLOOD PRESSURE: 90 MMHG

## 2022-10-26 DIAGNOSIS — R00.2 PALPITATION: ICD-10-CM

## 2022-10-26 DIAGNOSIS — I10 PRIMARY HYPERTENSION: Primary | ICD-10-CM

## 2022-10-26 DIAGNOSIS — E03.9 ACQUIRED HYPOTHYROIDISM: ICD-10-CM

## 2022-10-26 PROCEDURE — 99214 OFFICE O/P EST MOD 30 MIN: CPT | Performed by: FAMILY MEDICINE

## 2022-10-26 RX ORDER — LEVOTHYROXINE SODIUM 0.03 MG/1
25 TABLET ORAL DAILY
Qty: 60 TABLET | Refills: 1 | Status: SHIPPED | OUTPATIENT
Start: 2022-10-26 | End: 2023-01-23

## 2022-10-26 RX ORDER — AMLODIPINE BESYLATE 5 MG/1
5 TABLET ORAL DAILY
Qty: 30 TABLET | Refills: 1 | Status: SHIPPED | OUTPATIENT
Start: 2022-10-26 | End: 2022-11-17

## 2022-10-26 NOTE — PROGRESS NOTES
Odilon Melendez is a 52 y.o. female.     Chief Complaint   Patient presents with   • Hypertension     4 week follow up. Discuss lab results       History of Present Illness     Elevated BP; last OV noticed her BP elevated . Home BP reading for  2 weeks showed BP in 130-140's/80-90's . Denies CP/HA/SOB  Eats low salt HD   He watch monitor her HR , her average HR 's   Denies SOB/dizziness       Hypothyroidism   This is a new problem. Labs showed elevated TSH      Lab Results   Component Value Date    TSH 7.920 (H) 10/19/2022       The following portions of the patient's history were reviewed and updated as appropriate: allergies, current medications, past family history, past medical history, past social history, past surgical history and problem list.        Review of Systems   Respiratory: Negative for shortness of breath, wheezing and stridor.    Cardiovascular: Negative for chest pain, palpitations and leg swelling.       Vitals:    10/26/22 0844   BP: 118/90   Pulse: 86   Resp: 16   Temp: 97.1 °F (36.2 °C)   SpO2: 99%           10/26/22  0844   Weight: 86.6 kg (191 lb)         Body mass index is 32.79 kg/m².      Current Outpatient Medications   Medication Sig Dispense Refill   • naproxen sodium (ALEVE) 220 MG tablet Take 1 tablet by mouth 2 (Two) Times a Day As Needed. Prn     • amLODIPine (NORVASC) 5 MG tablet Take 1 tablet by mouth Daily. 30 tablet 1   • levothyroxine (SYNTHROID, LEVOTHROID) 25 MCG tablet Take 1 tablet by mouth Daily. 60 tablet 1     No current facility-administered medications for this visit.                Objective   Physical Exam  Vitals and nursing note reviewed.   Constitutional:       General: She is not in acute distress.     Appearance: She is not ill-appearing, toxic-appearing or diaphoretic.   HENT:      Mouth/Throat:      Mouth: Mucous membranes are moist.   Neck:      Comments: No enlarged thyroid    Cardiovascular:      Rate and Rhythm: Normal rate and regular  rhythm.      Heart sounds: Normal heart sounds. No murmur heard.    No friction rub. No gallop.   Pulmonary:      Effort: Pulmonary effort is normal. No respiratory distress.      Breath sounds: Normal breath sounds. No stridor. No wheezing, rhonchi or rales.   Musculoskeletal:      Cervical back: Neck supple.   Skin:     General: Skin is warm.      Findings: No erythema.   Neurological:      Mental Status: She is alert and oriented to person, place, and time.   Psychiatric:         Mood and Affect: Mood normal.         Behavior: Behavior normal.         Thought Content: Thought content normal.         Judgment: Judgment normal.           Assessment & Plan   Diagnoses and all orders for this visit:    1. Primary hypertension (Primary);  - Newly dx, will start on;   -     amLODIPine (NORVASC) 5 MG tablet; Take 1 tablet by mouth Daily.  Dispense: 30 tablet; Refill: 1  - Discussed in length about lifestyle modification , wt loss, eating healthy , daily exercise   Encouraged patient to maintain a heart healthy diet/DASH diet, exercise as tolerated, limit sodium intake to no more than 1,500mg/day, limit alcohol intake, maintain medication compliance and practice relaxation techniques to reduce stress.     2. Palpitation;  - Close BP and HR monitoring and f/u , Bp login form provided    3. Acquired hypothyroidism  - Will start on ;  -     levothyroxine (SYNTHROID, LEVOTHROID) 25 MCG tablet; Take 1 tablet by mouth Daily.  Dispense: 60 tablet; Refill: 1  - Will check TSH in 6 weeks         Patient was given instructions and counseling regarding her condition or for health maintenance advice.   Please see specific information pulled into the AVS if appropriate.   Medical assistant and I wore mask and eyewear protection during entire encounter.    Patient wore mask.         I have fully discussed the nature of the medical condition(s) risks, complications, management, safe and proper use of medications.   She stated no  allergy to the above prescribed medication.  I have discussed the SIDE EFFECT OF MEDICATION and importance TO report any side effect , the patient expressed good understanding.  Encouraged medication compliance and the importance of keeping scheduled follow up appointments with me and any other providers.    Patient instructed to follow up with our office for results on any labs/imaging ordered during this visit.    Home care discussed  All questions answered  Patient verbalizes understanding and agrees to treatment plan.     Follow up: Return in about 2 weeks (around 11/9/2022) for f/u on HTN.

## 2022-11-08 ENCOUNTER — TELEPHONE (OUTPATIENT)
Dept: GASTROENTEROLOGY | Facility: CLINIC | Age: 52
End: 2022-11-08

## 2022-11-08 NOTE — TELEPHONE ENCOUNTER
----- Message from Jay Olsen MD sent at 11/6/2022  5:22 PM EST -----  Mild stable elevation of ALP.  US liver was unremarkable.  AMA was negative.  Office f/u in 3 mos with labs prior.

## 2022-11-08 NOTE — TELEPHONE ENCOUNTER
Called pt and advised of Dr Olsen's note.  Pt verbalized understanding.     Follow up lab 2/1/23 @8am.    Follow up appt Dr Olsen 2/8/23 @8am.

## 2022-11-10 ENCOUNTER — OFFICE VISIT (OUTPATIENT)
Dept: FAMILY MEDICINE CLINIC | Facility: CLINIC | Age: 52
End: 2022-11-10

## 2022-11-10 VITALS
DIASTOLIC BLOOD PRESSURE: 84 MMHG | TEMPERATURE: 97.5 F | SYSTOLIC BLOOD PRESSURE: 110 MMHG | HEART RATE: 81 BPM | WEIGHT: 191 LBS | OXYGEN SATURATION: 98 % | HEIGHT: 64 IN | RESPIRATION RATE: 16 BRPM | BODY MASS INDEX: 32.61 KG/M2

## 2022-11-10 DIAGNOSIS — R00.2 PALPITATIONS: Primary | ICD-10-CM

## 2022-11-10 DIAGNOSIS — I10 PRIMARY HYPERTENSION: ICD-10-CM

## 2022-11-10 DIAGNOSIS — E03.9 ACQUIRED HYPOTHYROIDISM: ICD-10-CM

## 2022-11-10 LAB
ERYTHROCYTE [DISTWIDTH] IN BLOOD BY AUTOMATED COUNT: 13.9 % (ref 12.3–15.4)
HCT VFR BLD AUTO: 39.9 % (ref 34–46.6)
HGB BLD-MCNC: 12.7 G/DL (ref 12–15.9)
MCH RBC QN AUTO: 26.7 PG (ref 26.6–33)
MCHC RBC AUTO-ENTMCNC: 31.8 G/DL (ref 31.5–35.7)
MCV RBC AUTO: 83.8 FL (ref 79–97)
PLATELET # BLD AUTO: 325 10*3/MM3 (ref 140–450)
RBC # BLD AUTO: 4.76 10*6/MM3 (ref 3.77–5.28)
TSH SERPL DL<=0.005 MIU/L-ACNC: 6.44 UIU/ML (ref 0.27–4.2)
WBC # BLD AUTO: 8.32 10*3/MM3 (ref 3.4–10.8)

## 2022-11-10 PROCEDURE — 99214 OFFICE O/P EST MOD 30 MIN: CPT | Performed by: FAMILY MEDICINE

## 2022-11-10 PROCEDURE — 93000 ELECTROCARDIOGRAM COMPLETE: CPT | Performed by: FAMILY MEDICINE

## 2022-11-10 NOTE — PROGRESS NOTES
Odilon Melendez is a 52 y.o. female.     Chief Complaint   Patient presents with   • Hypertension     2 week follow up       History of Present Illness     HTN; her BP much improved since starts on amlodipine 5 mg, eats more HD. Home BP reading wnl  She noticed her HR always high even at rest for > 3 months. She is monitoring that with her watch.    Hypothyroidism   This is a chronic problem. Nothing aggravates the symptoms.   on Levothyr 25 mcg.     Lab Results   Component Value Date    TSH 7.920 (H) 10/19/2022           ECG 12 Lead    Date/Time: 11/10/2022 9:57 AM  Performed by: Brian Durham MD  Authorized by: Brian Durham MD   Comparison: not compared with previous ECG   Rhythm: sinus tachycardia  Rate: tachycardic  Conduction: conduction normal  ST Segments: ST segments normal  T Waves: T waves normal  QRS axis: normal    Clinical impression: normal ECG            The following portions of the patient's history were reviewed and updated as appropriate: allergies, current medications, past family history, past medical history, past social history, past surgical history and problem list.        Review of Systems   Cardiovascular: Positive for palpitations. Negative for chest pain and leg swelling.       Vitals:    11/10/22 0914   BP: 110/84   Pulse: 81   Resp: 16   Temp: 97.5 °F (36.4 °C)   SpO2: 98%           11/10/22  0914   Weight: 86.6 kg (191 lb)         Body mass index is 32.79 kg/m².      Current Outpatient Medications   Medication Sig Dispense Refill   • amLODIPine (NORVASC) 5 MG tablet Take 1 tablet by mouth Daily. 30 tablet 1   • levothyroxine (SYNTHROID, LEVOTHROID) 25 MCG tablet Take 1 tablet by mouth Daily. 60 tablet 1   • naproxen sodium (ALEVE) 220 MG tablet Take 1 tablet by mouth 2 (Two) Times a Day As Needed. Prn       No current facility-administered medications for this visit.                Objective   Physical Exam  Vitals and nursing note reviewed.   Constitutional:        General: She is not in acute distress.     Appearance: She is not ill-appearing, toxic-appearing or diaphoretic.   HENT:      Mouth/Throat:      Mouth: Mucous membranes are moist.   Eyes:      Conjunctiva/sclera: Conjunctivae normal.   Neck:      Comments: No enlarged thyroid    Cardiovascular:      Rate and Rhythm: Regular rhythm. Tachycardia present.      Heart sounds: Normal heart sounds. No murmur heard.  Pulmonary:      Effort: Pulmonary effort is normal. No respiratory distress.      Breath sounds: Normal breath sounds. No stridor. No wheezing or rhonchi.   Musculoskeletal:      Cervical back: Neck supple.   Skin:     General: Skin is warm.   Neurological:      Mental Status: She is alert and oriented to person, place, and time.   Psychiatric:         Mood and Affect: Mood normal.         Behavior: Behavior normal.         Thought Content: Thought content normal.           Assessment & Plan   Diagnoses and all orders for this visit:    1. Palpitations (Primary);  - DDx anemia , thyroid dis, heart arrhythmia , lyte disorder , others   -     ECG 12 Lead  -     CBC (No Diff)  -     Ambulatory Referral to Cardiology    2. Primary hypertension  - Stable, continue current Rx    3. Acquired hypothyroidism  -     TSH  - Will adjust dose accordingly         Patient was given instructions and counseling regarding her condition or for health maintenance advice.   Please see specific information pulled into the AVS if appropriate.   Medical assistant and I wore mask and eyewear protection during entire encounter.    Patient wore mask.         I have fully discussed the nature of the medical condition(s) risks, complications, management, safe and proper use of medications.   She stated no allergy to the above prescribed medication.  I have discussed the SIDE EFFECT OF MEDICATION and importance TO report any side effect , the patient expressed good understanding.  Encouraged medication compliance and the importance of  keeping scheduled follow up appointments with me and any other providers.    Patient instructed to follow up with our office for results on any labs/imaging ordered during this visit.    Home care discussed  All questions answered  Patient verbalizes understanding and agrees to treatment plan.     Follow up: Return in about 1 month (around 12/13/2022) for follow up on current illness.

## 2022-11-13 DIAGNOSIS — E03.9 ACQUIRED HYPOTHYROIDISM: Primary | ICD-10-CM

## 2022-11-17 DIAGNOSIS — I10 PRIMARY HYPERTENSION: ICD-10-CM

## 2022-11-17 RX ORDER — AMLODIPINE BESYLATE 5 MG/1
TABLET ORAL
Qty: 30 TABLET | Refills: 1 | Status: SHIPPED | OUTPATIENT
Start: 2022-11-17 | End: 2022-12-19

## 2022-12-07 ENCOUNTER — OFFICE VISIT (OUTPATIENT)
Dept: CARDIOLOGY | Facility: CLINIC | Age: 52
End: 2022-12-07

## 2022-12-07 VITALS
SYSTOLIC BLOOD PRESSURE: 120 MMHG | OXYGEN SATURATION: 98 % | HEART RATE: 87 BPM | WEIGHT: 190.8 LBS | BODY MASS INDEX: 32.58 KG/M2 | DIASTOLIC BLOOD PRESSURE: 70 MMHG | HEIGHT: 64 IN

## 2022-12-07 DIAGNOSIS — I49.3 PVC (PREMATURE VENTRICULAR CONTRACTION): ICD-10-CM

## 2022-12-07 DIAGNOSIS — R00.2 PALPITATIONS: Primary | ICD-10-CM

## 2022-12-07 DIAGNOSIS — E03.9 HYPOTHYROIDISM (ACQUIRED): ICD-10-CM

## 2022-12-07 DIAGNOSIS — G47.10 HYPERSOMNIA: ICD-10-CM

## 2022-12-07 DIAGNOSIS — I10 ESSENTIAL HYPERTENSION: ICD-10-CM

## 2022-12-07 PROCEDURE — 93000 ELECTROCARDIOGRAM COMPLETE: CPT | Performed by: INTERNAL MEDICINE

## 2022-12-07 PROCEDURE — 99204 OFFICE O/P NEW MOD 45 MIN: CPT | Performed by: INTERNAL MEDICINE

## 2022-12-07 NOTE — PROGRESS NOTES
[facility]      Patient Name: Filomena Melendez  :1970  Age: 52 y.o.  Encounter Provider:  Moo Chow Jr, MD      Chief Complaint:   Chief Complaint   Patient presents with   • Shortness of Breath   • Palpitations   • Edema   • Fatigue   • Follow-up         HPI  Filomena Melendez is a 52 y.o. female recently diagnosed hypothyroidism and hypertension presents for evaluation of palpitations.  Patient has been noted to have a high resting heart rate at PCP office.  She is uncertain as to the chronicity of this finding.  She does note more palpitations but no dizziness or syncope.  She is noted to have a PVC on EKG today in clinic.  She is limited activity due to recent problems with her ankle but she normally tries to walk on the treadmill at 2 to 3 mph for about 30 minutes.  With that activity she has no chest pain or shortness of air.  No orthopnea, PND or edema.  She does admit to snoring and hypersomnia.  She was just recently diagnosed with hypertension and started on medications.  No family history of premature coronary artery disease or sudden cardiac death.  She is a lifelong non-smoker denies alcohol or illicit drug use.      The following portions of the patient's history were reviewed and updated as appropriate: allergies, current medications, past family history, past medical history, past social history, past surgical history and problem list.      Review of Systems   Constitutional: Positive for malaise/fatigue. Negative for chills and fever.   HENT: Negative for hoarse voice and sore throat.    Eyes: Negative for double vision and photophobia.   Cardiovascular: Positive for palpitations. Negative for chest pain, leg swelling, near-syncope, orthopnea, paroxysmal nocturnal dyspnea and syncope.   Respiratory: Negative for cough and wheezing.    Skin: Negative for poor wound healing and rash.   Musculoskeletal: Negative for arthritis and joint swelling.   Gastrointestinal: Negative for  "bloating, abdominal pain, hematemesis and hematochezia.   Neurological: Negative for dizziness and focal weakness.   Psychiatric/Behavioral: Negative for depression and suicidal ideas.       OBJECTIVE:   Vital Signs  Vitals:    12/07/22 0831   BP: 120/70   Pulse: 87   SpO2: 98%     Estimated body mass index is 32.75 kg/m² as calculated from the following:    Height as of this encounter: 162.6 cm (64\").    Weight as of this encounter: 86.5 kg (190 lb 12.8 oz).    Vitals reviewed.   Constitutional:       Appearance: Healthy appearance. Not in distress.   Neck:      Vascular: No JVR. JVD normal.   Pulmonary:      Effort: Pulmonary effort is normal.      Breath sounds: Normal breath sounds. No wheezing. No rhonchi. No rales.   Chest:      Chest wall: Not tender to palpatation.   Cardiovascular:      PMI at left midclavicular line. Normal rate. Regular rhythm. Normal S1. Normal S2.      Murmurs: There is no murmur.      No gallop. No click. No rub.   Pulses:     Intact distal pulses.   Edema:     Peripheral edema absent.   Abdominal:      General: Bowel sounds are normal.      Palpations: Abdomen is soft.      Tenderness: There is no abdominal tenderness.   Musculoskeletal: Normal range of motion.         General: No tenderness. Skin:     General: Skin is warm and dry.   Neurological:      General: No focal deficit present.      Mental Status: Alert and oriented to person, place and time.           ECG 12 Lead    Date/Time: 12/7/2022 8:53 AM  Performed by: Moo Chow Jr., MD  Authorized by: Moo Chow Jr., MD   Comparison: not compared with previous ECG   Previous ECG: no previous ECG available  Rhythm: sinus rhythm  Ectopy: unifocal PVCs    Clinical impression: non-specific ECG                  ASSESSMENT:     52-year-old female with recently diagnosed hypertension and hypothyroidism presents for evaluation of palpitation    PLAN OF CARE:     1. Palpitations -high resting heart rate with PVC on EKG.  Check " Holter monitor.  2. Hypersomnia -Home sleep study  3. Hypertension -seemingly well controlled at this time.  Continue salt restricted diet.  4. Hypothyroidism    Likely multifactorial presentation for elevated heart rate and palpitations.  Recommend caloric restriction and increased activity.  Check diagnostic testing as noted above.  Return to clinic 6 months.             Discharge Medications          Accurate as of December 7, 2022  8:51 AM. If you have any questions, ask your nurse or doctor.            Continue These Medications      Instructions Start Date   amLODIPine 5 MG tablet  Commonly known as: NORVASC   TAKE 1 TABLET BY MOUTH EVERY DAY      levothyroxine 25 MCG tablet  Commonly known as: SYNTHROID, LEVOTHROID   25 mcg, Oral, Daily      naproxen sodium 220 MG tablet  Commonly known as: ALEVE   220 mg, Oral, 2 Times Daily PRN, Prn             Thank you for allowing me to participate in the care of your patient,      Sincerely,   Moo Chow Jr, MD  [facility]  12/07/22  08:51 EST

## 2022-12-13 ENCOUNTER — OFFICE VISIT (OUTPATIENT)
Dept: FAMILY MEDICINE CLINIC | Facility: CLINIC | Age: 52
End: 2022-12-13

## 2022-12-13 VITALS
OXYGEN SATURATION: 98 % | WEIGHT: 191 LBS | TEMPERATURE: 97.8 F | HEART RATE: 65 BPM | BODY MASS INDEX: 32.61 KG/M2 | SYSTOLIC BLOOD PRESSURE: 118 MMHG | RESPIRATION RATE: 16 BRPM | HEIGHT: 64 IN | DIASTOLIC BLOOD PRESSURE: 82 MMHG

## 2022-12-13 DIAGNOSIS — J01.40 ACUTE NON-RECURRENT PANSINUSITIS: ICD-10-CM

## 2022-12-13 DIAGNOSIS — R00.2 PALPITATION: ICD-10-CM

## 2022-12-13 DIAGNOSIS — E03.9 ACQUIRED HYPOTHYROIDISM: ICD-10-CM

## 2022-12-13 DIAGNOSIS — I10 PRIMARY HYPERTENSION: Primary | ICD-10-CM

## 2022-12-13 DIAGNOSIS — R74.8 ALKALINE PHOSPHATASE ELEVATION: ICD-10-CM

## 2022-12-13 PROCEDURE — 99214 OFFICE O/P EST MOD 30 MIN: CPT | Performed by: FAMILY MEDICINE

## 2022-12-13 RX ORDER — DOXYCYCLINE HYCLATE 100 MG/1
100 CAPSULE ORAL 2 TIMES DAILY
Qty: 20 CAPSULE | Refills: 0 | OUTPATIENT
Start: 2022-12-13 | End: 2023-04-04

## 2022-12-13 NOTE — PROGRESS NOTES
Odilon Melendez is a 52 y.o. female.     Chief Complaint   Patient presents with   • Palpitations     1 month follow up.   • Hypertension   • Hypothyroidism   • Alopecia     Hair loss notice a month ago every time she wash and comb her hair. Possible medication.    • Cough     Dry cough ignoring at night, congestion, x 4-6 weeks pressure around the eyes. Nasal drip.        History of Present Illness     Palpitation ; still has on/off raise in her HR > 100, she saw cardiology , EKG showed PVC , waiting for Holter monitor, other cardiac tests and sleep test   Her HR went up x 3 to > 100   Sleep study scheduled on 1/2023    C/o Hair loss for many years , worse at winter time. She has hypothyroid on Rx, , She is on MVT    C/o dry cough , congestion , sinus pressure with nasal drainage for > 4 weeks, cough with thick green sputum , took plenty of fluids with no better , the cough affecting her sleep.     Elevated alk phos; saw GI,w/u done  Showed  Neg liver US ,  alk phos level is stable , neg AMA   likely due to NAFLD per GI note .    HTN; home BP wnl, doing well on Rx    Hypothyroidism   Doing well on Levothyr 25 mcg. Started on it on 10/26/2022  Time to recheck TSH and adjust the dose      Lab Results   Component Value Date    TSH 6.440 (H) 11/10/2022       Labs and notes from GI and cardiology has been reviewed     The following portions of the patient's history were reviewed and updated as appropriate: allergies, current medications, past family history, past medical history, past social history, past surgical history and problem list.        Review of Systems   Respiratory: Positive for cough. Negative for shortness of breath and wheezing.    Cardiovascular: Positive for palpitations. Negative for chest pain and leg swelling.       Vitals:    12/13/22 1004   BP: 118/82   Pulse: 65   Resp: 16   Temp: 97.8 °F (36.6 °C)   SpO2: 98%           12/13/22  1004   Weight: 86.6 kg (191 lb)         Body mass index is  32.79 kg/m².      Current Outpatient Medications   Medication Sig Dispense Refill   • amLODIPine (NORVASC) 5 MG tablet TAKE 1 TABLET BY MOUTH EVERY DAY 30 tablet 1   • levothyroxine (SYNTHROID, LEVOTHROID) 25 MCG tablet Take 1 tablet by mouth Daily. 60 tablet 1   • naproxen sodium (ALEVE) 220 MG tablet Take 1 tablet by mouth 2 (Two) Times a Day As Needed. Prn     • doxycycline (VIBRAMYCIN) 100 MG capsule Take 1 capsule by mouth 2 (Two) Times a Day. 20 capsule 0     No current facility-administered medications for this visit.                Objective   Physical Exam  Vitals and nursing note reviewed.   Constitutional:       General: She is not in acute distress.     Appearance: She is not ill-appearing, toxic-appearing or diaphoretic.   Neck:      Comments: No enlarged thyroid    Cardiovascular:      Rate and Rhythm: Normal rate and regular rhythm.      Heart sounds: Normal heart sounds. No murmur heard.  Pulmonary:      Effort: Pulmonary effort is normal. No respiratory distress.      Breath sounds: Normal breath sounds. No stridor. No wheezing, rhonchi or rales.   Musculoskeletal:      Cervical back: Neck supple.   Neurological:      Mental Status: She is alert and oriented to person, place, and time.   Psychiatric:         Mood and Affect: Mood normal.         Behavior: Behavior normal.         Thought Content: Thought content normal.           Assessment & Plan   Diagnoses and all orders for this visit:    1. Primary hypertension (Primary)  - Stable, continue current Rx    2. Acquired hypothyroidism  -     TSH  - Will adjust dose accordingly     3. Palpitation;  - Stable, waiting for other tests and Holter monitor     4. Alkaline phosphatase elevation  - Labs are neg, level stable  - Close monitoring and f/u     5. Acute non-recurrent pansinusitis  -     doxycycline (VIBRAMYCIN) 100 MG capsule; Take 1 capsule by mouth 2 (Two) Times a Day.  Dispense: 20 capsule; Refill: 0  - Discussed supportive care     Patient  was given instructions and counseling regarding her condition or for health maintenance advice.   Please see specific information pulled into the AVS if appropriate.   Medical assistant and I wore mask and eyewear protection during entire encounter.    Patient wore mask.         I have fully discussed the nature of the medical condition(s) risks, complications, management, safe and proper use of medications.   She stated no allergy to the above prescribed medication.  I have discussed the SIDE EFFECT OF MEDICATION and importance TO report any side effect , the patient expressed good understanding.  Encouraged medication compliance and the importance of keeping scheduled follow up appointments with me and any other providers.    Patient instructed to follow up with our office for results on any labs/imaging ordered during this visit.    Home care discussed  All questions answered  Patient verbalizes understanding and agrees to treatment plan.     Follow up: Return for WILL CALL YOU FOR LBAS/XR RESULT.

## 2022-12-14 LAB — TSH SERPL DL<=0.005 MIU/L-ACNC: 3.97 UIU/ML (ref 0.27–4.2)

## 2022-12-17 DIAGNOSIS — I10 PRIMARY HYPERTENSION: ICD-10-CM

## 2022-12-19 RX ORDER — AMLODIPINE BESYLATE 5 MG/1
TABLET ORAL
Qty: 90 TABLET | Refills: 1 | OUTPATIENT
Start: 2022-12-19 | End: 2023-04-04

## 2023-01-06 ENCOUNTER — HOSPITAL ENCOUNTER (OUTPATIENT)
Dept: SLEEP MEDICINE | Facility: HOSPITAL | Age: 53
Discharge: HOME | End: 2023-01-06
Admitting: INTERNAL MEDICINE
Payer: COMMERCIAL

## 2023-01-06 DIAGNOSIS — G47.10 HYPERSOMNIA: ICD-10-CM

## 2023-01-06 PROCEDURE — 95806 SLEEP STUDY UNATT&RESP EFFT: CPT | Performed by: INTERNAL MEDICINE

## 2023-01-06 PROCEDURE — 95806 SLEEP STUDY UNATT&RESP EFFT: CPT

## 2023-01-21 DIAGNOSIS — E03.9 ACQUIRED HYPOTHYROIDISM: ICD-10-CM

## 2023-01-23 RX ORDER — LEVOTHYROXINE SODIUM 0.03 MG/1
TABLET ORAL
Qty: 90 TABLET | Refills: 1 | Status: SHIPPED | OUTPATIENT
Start: 2023-01-23

## 2023-01-31 DIAGNOSIS — R74.8 ELEVATED ALKALINE PHOSPHATASE LEVEL: Primary | ICD-10-CM

## 2023-02-01 ENCOUNTER — LAB (OUTPATIENT)
Dept: GASTROENTEROLOGY | Facility: CLINIC | Age: 53
End: 2023-02-01
Payer: COMMERCIAL

## 2023-02-03 ENCOUNTER — TELEPHONE (OUTPATIENT)
Dept: CARDIOLOGY | Facility: CLINIC | Age: 53
End: 2023-02-03
Payer: COMMERCIAL

## 2023-02-03 NOTE — TELEPHONE ENCOUNTER
I spoke with Filomena Melendez and updated pt on results/recommendations from provider.  Pt verbalized understanding and has no further questions at this time.    Thank you,    Virginia Holder, RN  Triage [facility]  02/03/23 08:31 EST

## 2023-02-03 NOTE — TELEPHONE ENCOUNTER
Please inform patient that The home sleep test did not show any evidence of sleep apnea. Keep Ana appt

## 2023-02-08 ENCOUNTER — OFFICE VISIT (OUTPATIENT)
Dept: GASTROENTEROLOGY | Facility: CLINIC | Age: 53
End: 2023-02-08
Payer: COMMERCIAL

## 2023-02-08 VITALS
HEART RATE: 83 BPM | BODY MASS INDEX: 32.97 KG/M2 | HEIGHT: 64 IN | WEIGHT: 193.1 LBS | TEMPERATURE: 97.6 F | DIASTOLIC BLOOD PRESSURE: 71 MMHG | SYSTOLIC BLOOD PRESSURE: 107 MMHG

## 2023-02-08 DIAGNOSIS — K76.0 NAFLD (NONALCOHOLIC FATTY LIVER DISEASE): ICD-10-CM

## 2023-02-08 DIAGNOSIS — R74.8 ELEVATED ALKALINE PHOSPHATASE LEVEL: Primary | ICD-10-CM

## 2023-02-08 PROCEDURE — 99213 OFFICE O/P EST LOW 20 MIN: CPT | Performed by: INTERNAL MEDICINE

## 2023-02-08 NOTE — PROGRESS NOTES
Chief Complaint   Patient presents with   • elevated alkaline phosphatase      Subjective     HPI  Filomena Melendez is a 52 y.o. female who presents today for office follow up. Seen late last year for isolated elevation of ALP. Chronic dating back to at least 2018.     Most recent .  Fractionation showed equal parts liver and bone.  US liver does show mild hepatic steatosis.      BMI today is 33    Objective   Vitals:    02/08/23 0748   BP: 107/71   Pulse: 83   Temp: 97.6 °F (36.4 °C)       Physical Exam  The following data was reviewed by: Jay Olsen MD on 02/08/2023:  Common labs    Common Labs 10/17/22 11/10/22 2/1/23 2/1/23      0750 0750   Glucose    86   BUN    12   Creatinine    0.88   Sodium    143   Potassium    3.9   Chloride    106   Calcium    9.5   Total Protein    7.3   Albumin    4.2   Total Bilirubin    0.3   Alkaline Phosphatase 163 (A)   135 (A)   AST (SGOT)    26   ALT (SGPT)    30   WBC  8.32 6.96    Hemoglobin  12.7 12.0    Hematocrit  39.9 37.6    Platelets  325 297    (A) Abnormal value            Data reviewed: Radiologic studies US liver 2022     Assessment & Plan   Assessment:     1. Elevated alkaline phosphatase level    2. NAFLD (nonalcoholic fatty liver disease)      Plan:   52-year-old female who has a mild isolated elevation of her alkaline phosphatase with otherwise normal liver enzymes.  Elevation is chronic.  Fractionation shows equal parts intestinal and bone without significant elevation of either.  She does have evidence of hepatic steatosis on ultrasound.  AMA is negative.  At this point I would not recommend any further work-up and would continue with observation with intermittent monitoring of her LFTs.  Discussed the importance of weight loss given some degree of underlying hepatic steatosis.  As for colorectal cancer screening she still has a Cologuard test at home has not yet submitted so I did encourage her to do so or certainly offered to schedule her for a  screening colonoscopy if she decides she does not want to do the Cologuard.          Jay Olsen M.D.  [facility]  [address] [place] [address]  Office: [phone]

## 2023-02-27 ENCOUNTER — TELEPHONE (OUTPATIENT)
Dept: SLEEP MEDICINE | Facility: HOSPITAL | Age: 53
End: 2023-02-27
Payer: COMMERCIAL

## 2023-03-07 ENCOUNTER — TRANSCRIBE ORDERS (OUTPATIENT)
Dept: ADMINISTRATIVE | Facility: HOSPITAL | Age: 53
End: 2023-03-07
Payer: COMMERCIAL

## 2023-03-07 DIAGNOSIS — R06.02 SHORTNESS OF BREATH: ICD-10-CM

## 2023-03-07 DIAGNOSIS — I10 ESSENTIAL (PRIMARY) HYPERTENSION: ICD-10-CM

## 2023-03-17 ENCOUNTER — HOSPITAL ENCOUNTER (OUTPATIENT)
Dept: CARDIOLOGY | Facility: HOSPITAL | Age: 53
Discharge: HOME | End: 2023-03-17
Admitting: FAMILY MEDICINE
Payer: COMMERCIAL

## 2023-03-17 VITALS
BODY MASS INDEX: 32.95 KG/M2 | HEIGHT: 64 IN | WEIGHT: 193 LBS | DIASTOLIC BLOOD PRESSURE: 66 MMHG | SYSTOLIC BLOOD PRESSURE: 124 MMHG

## 2023-03-17 DIAGNOSIS — R06.02 SHORTNESS OF BREATH: ICD-10-CM

## 2023-03-17 DIAGNOSIS — I10 ESSENTIAL (PRIMARY) HYPERTENSION: ICD-10-CM

## 2023-03-17 LAB
AORTIC DIMENSIONLESS INDEX: 0.8 (DI)
BH CV ECHO MEAS - AO MAX PG: 5.6 MMHG
BH CV ECHO MEAS - AO MEAN PG: 3 MMHG
BH CV ECHO MEAS - AO ROOT DIAM: 2.9 CM
BH CV ECHO MEAS - AO V2 MAX: 118.7 CM/SEC
BH CV ECHO MEAS - AO V2 VTI: 24.1 CM
BH CV ECHO MEAS - AVA(I,D): 2.47 CM2
BH CV ECHO MEAS - EDV(CUBED): 93.1 ML
BH CV ECHO MEAS - EDV(MOD-SP2): 90 ML
BH CV ECHO MEAS - EDV(MOD-SP4): 93 ML
BH CV ECHO MEAS - EF(MOD-BP): 61.5 %
BH CV ECHO MEAS - EF(MOD-SP2): 64.4 %
BH CV ECHO MEAS - EF(MOD-SP4): 61.3 %
BH CV ECHO MEAS - ESV(CUBED): 24.1 ML
BH CV ECHO MEAS - ESV(MOD-SP2): 32 ML
BH CV ECHO MEAS - ESV(MOD-SP4): 36 ML
BH CV ECHO MEAS - FS: 36.3 %
BH CV ECHO MEAS - IVS/LVPW: 0.87 CM
BH CV ECHO MEAS - IVSD: 0.83 CM
BH CV ECHO MEAS - LAT PEAK E' VEL: 13.2 CM/SEC
BH CV ECHO MEAS - LV DIASTOLIC VOL/BSA (35-75): 48.3 CM2
BH CV ECHO MEAS - LV MASS(C)D: 132.7 GRAMS
BH CV ECHO MEAS - LV MAX PG: 3.6 MMHG
BH CV ECHO MEAS - LV MEAN PG: 1.87 MMHG
BH CV ECHO MEAS - LV SYSTOLIC VOL/BSA (12-30): 18.7 CM2
BH CV ECHO MEAS - LV V1 MAX: 94.3 CM/SEC
BH CV ECHO MEAS - LV V1 VTI: 20.5 CM
BH CV ECHO MEAS - LVIDD: 4.5 CM
BH CV ECHO MEAS - LVIDS: 2.9 CM
BH CV ECHO MEAS - LVOT AREA: 2.9 CM2
BH CV ECHO MEAS - LVOT DIAM: 1.92 CM
BH CV ECHO MEAS - LVPWD: 0.95 CM
BH CV ECHO MEAS - MED PEAK E' VEL: 10 CM/SEC
BH CV ECHO MEAS - MR MAX PG: 12.1 MMHG
BH CV ECHO MEAS - MR MAX VEL: 173.7 CM/SEC
BH CV ECHO MEAS - MV A DUR: 0.1 SEC
BH CV ECHO MEAS - MV A MAX VEL: 39 CM/SEC
BH CV ECHO MEAS - MV DEC SLOPE: 415.8 CM/SEC2
BH CV ECHO MEAS - MV DEC TIME: 0.21 MSEC
BH CV ECHO MEAS - MV E MAX VEL: 87.8 CM/SEC
BH CV ECHO MEAS - MV E/A: 2.25
BH CV ECHO MEAS - MV MAX PG: 4.4 MMHG
BH CV ECHO MEAS - MV MEAN PG: 1.89 MMHG
BH CV ECHO MEAS - MV P1/2T: 72.2 MSEC
BH CV ECHO MEAS - MV V2 VTI: 25 CM
BH CV ECHO MEAS - MVA(P1/2T): 3 CM2
BH CV ECHO MEAS - MVA(VTI): 2.38 CM2
BH CV ECHO MEAS - PA ACC TIME: 0.11 SEC
BH CV ECHO MEAS - PA PR(ACCEL): 30.3 MMHG
BH CV ECHO MEAS - PA V2 MAX: 96.4 CM/SEC
BH CV ECHO MEAS - PULM A REVS DUR: 0.1 SEC
BH CV ECHO MEAS - PULM A REVS VEL: 24.9 CM/SEC
BH CV ECHO MEAS - PULM DIAS VEL: 53.1 CM/SEC
BH CV ECHO MEAS - PULM S/D: 0.91
BH CV ECHO MEAS - PULM SYS VEL: 48.4 CM/SEC
BH CV ECHO MEAS - RAP SYSTOLE: 8 MMHG
BH CV ECHO MEAS - RV MAX PG: 1.06 MMHG
BH CV ECHO MEAS - RV V1 MAX: 51.4 CM/SEC
BH CV ECHO MEAS - RV V1 VTI: 12.7 CM
BH CV ECHO MEAS - RVSP: 25.2 MMHG
BH CV ECHO MEAS - SI(MOD-SP2): 30.1 ML/M2
BH CV ECHO MEAS - SI(MOD-SP4): 29.6 ML/M2
BH CV ECHO MEAS - SV(LVOT): 59.5 ML
BH CV ECHO MEAS - SV(MOD-SP2): 58 ML
BH CV ECHO MEAS - SV(MOD-SP4): 57 ML
BH CV ECHO MEAS - TAPSE (>1.6): 2.6 CM
BH CV ECHO MEAS - TR MAX PG: 17.2 MMHG
BH CV ECHO MEAS - TR MAX VEL: 207.5 CM/SEC
BH CV ECHO MEASUREMENTS AVERAGE E/E' RATIO: 7.57
BH CV XLRA - RV BASE: 3.1 CM
BH CV XLRA - RV LENGTH: 7.8 CM
BH CV XLRA - RV MID: 3.2 CM
BH CV XLRA - TDI S': 10.9 CM/SEC
LEFT ATRIUM VOLUME INDEX: 20.4 ML/M2
MAXIMAL PREDICTED HEART RATE: 169 BPM
STRESS TARGET HR: 144 BPM

## 2023-03-17 PROCEDURE — 93306 TTE W/DOPPLER COMPLETE: CPT | Performed by: INTERNAL MEDICINE

## 2023-03-17 PROCEDURE — 93306 TTE W/DOPPLER COMPLETE: CPT

## 2023-06-13 ENCOUNTER — OFFICE VISIT (OUTPATIENT)
Dept: CARDIOLOGY | Facility: CLINIC | Age: 53
End: 2023-06-13
Payer: COMMERCIAL

## 2023-06-13 VITALS
DIASTOLIC BLOOD PRESSURE: 78 MMHG | BODY MASS INDEX: 32.1 KG/M2 | HEART RATE: 72 BPM | SYSTOLIC BLOOD PRESSURE: 102 MMHG | WEIGHT: 188 LBS | HEIGHT: 64 IN

## 2023-06-13 DIAGNOSIS — I10 ESSENTIAL (PRIMARY) HYPERTENSION: ICD-10-CM

## 2023-06-13 DIAGNOSIS — R00.2 PALPITATIONS: Primary | ICD-10-CM

## 2023-06-13 DIAGNOSIS — E03.9 HYPOTHYROIDISM (ACQUIRED): ICD-10-CM

## 2023-06-13 PROCEDURE — 93000 ELECTROCARDIOGRAM COMPLETE: CPT | Performed by: NURSE PRACTITIONER

## 2023-06-13 PROCEDURE — 99214 OFFICE O/P EST MOD 30 MIN: CPT | Performed by: NURSE PRACTITIONER

## 2023-06-13 RX ORDER — LISDEXAMFETAMINE DIMESYLATE 40 MG/1
1 CAPSULE ORAL DAILY
COMMUNITY
Start: 2023-05-16

## 2023-06-13 RX ORDER — LEVOTHYROXINE SODIUM 13 UG/1
150 CAPSULE ORAL DAILY
COMMUNITY

## 2023-06-13 RX ORDER — TIRZEPATIDE 5 MG/.5ML
INJECTION, SOLUTION SUBCUTANEOUS
COMMUNITY
Start: 2023-06-08

## 2023-06-13 RX ORDER — BLOOD-GLUCOSE SENSOR
EACH MISCELLANEOUS
COMMUNITY
Start: 2023-05-17

## 2023-06-13 NOTE — PROGRESS NOTES
Date of Office Visit: 23  Encounter Provider: JESSICA Ortega  Place of Service: [facility] CARDIOLOGY  Patient Name: Filomena Melendez  :1970    Chief Complaint   Patient presents with    Hypertension    Palpitations    Follow-up   :     HPI: Filomena Melendez is a 52 y.o. female  with hypertension, hyperlipidemia, diabetes mellitus, premature ventricular contractions, sinus tachycardia, obesity, and hypothyroidism.    She is followed by Dr. Moo Chow. I will visit with her for the first time and have reviewed her medical record.     She is from [place] and has been in [place] approximately 6 years. She has 4 children. Her  follows with Dr. Miesha Bell here in our office.     She reported palpitations and had a 48-hour Holter monitor completed 2022 which showed predominant rhythm normal sinus with rare ventricular ectopy and no episodes of atrial fibrillation.  Average heart rate was 92 bpm.  She was referred for home sleep study due to snoring and hypersomnia which did not show sleep apnea.  She later reported shortness of breath and had an echocardiogram 2023 which showed normal left ventricular systolic function, normal diastolic function and no valve disease.    She presents today for reassessment.  Since being started on labetalol her blood pressure and swelling has resolved.  She has very few episodes of pulse reading above 100.  She denies chest pain tightness pressure shortness of breath.  Her watch tells her that her pulse is above 100 occasionally.  No dizziness or lightheadedness, near-syncope or syncope.  Her thyroid is doing better.  Allergies   Allergen Reactions    Penicillins Rash           Family and social history reviewed.     ROS  All other systems were reviewed and are negative          Objective:     Vitals:    23 1002   BP: 102/78   BP Location: Right arm   Patient Position: Sitting   Pulse: 72   Weight: 85.3 kg (188 lb)  "  Height: 162.6 cm (64\")     Body mass index is 32.27 kg/m².    PHYSICAL EXAM:  Pulmonary:      Effort: Pulmonary effort is normal.      Breath sounds: Normal breath sounds.   Cardiovascular:      Normal rate. Regular rhythm.         ECG 12 Lead    Date/Time: 6/13/2023 10:26 AM  Performed by: Annabelle Baumann APRN  Authorized by: Annabelle Baumann APRN   Comparison: compared with previous ECG   Similar to previous ECG  Rhythm: sinus rhythm  Rate: normal    Clinical impression: normal ECG          Current Outpatient Medications   Medication Sig Dispense Refill    Continuous Blood Gluc Sensor (FreeStyle Minh 3 Sensor) misc APPLY AND CHANGE AND REAPPLY EVERY 14 DAYS      empagliflozin (Jardiance) 25 MG tablet tablet Take  by mouth Daily.      estradiol (MINIVELLE, VIVELLE-DOT) 0.075 MG/24HR patch       levothyroxine sodium (TIROSINT) 150 MCG capsule Take 1 capsule by mouth Daily.      Mounjaro 5 MG/0.5ML solution pen-injector BELOW THE SKIN INJECT 5MG UNDER THE SKIN ONCE WEEKLY      nebivolol (BYSTOLIC) 5 MG tablet Take 1 tablet by mouth Daily.      vitamin D (ERGOCALCIFEROL) 1.25 MG (24254 UT) capsule capsule       Vyvanse 40 MG capsule Take 1 capsule by mouth Daily       No current facility-administered medications for this visit.     Assessment:       Diagnosis Plan   1. Palpitations  ECG 12 Lead      2. Essential (primary) hypertension        3. Hypothyroidism (acquired)             Orders Placed This Encounter   Procedures    ECG 12 Lead     This order was created via procedure documentation     Order Specific Question:   Release to patient     Answer:   Routine Release         Plan:   1.  52-year-old female with hypertension-appears well controlled on nebivolol.  2.  Palpitations and PVCs-improved since starting beta-blocker  3.  Diabetes mellitus on therapy including Jardiance  4.  Hypothyroidism on replacement therapy  5.  Obesity.  She is on Mounjaro  6.  Sinus tach- improved. Continue Bystolic       Follow up in " 6 months.  Call with questions or concerns.               It has been a pleasure to participate in this patient's care.      Thank you,  JESSICA Ortega      **I used Dragon to dictate this note:**

## 2023-06-16 DIAGNOSIS — I10 PRIMARY HYPERTENSION: ICD-10-CM

## 2023-06-16 RX ORDER — AMLODIPINE BESYLATE 5 MG/1
TABLET ORAL
Qty: 90 TABLET | Refills: 1 | OUTPATIENT
Start: 2023-06-16

## 2023-08-09 ENCOUNTER — OFFICE VISIT (OUTPATIENT)
Dept: GASTROENTEROLOGY | Facility: CLINIC | Age: 53
End: 2023-08-09
Payer: COMMERCIAL

## 2023-08-09 VITALS
HEART RATE: 96 BPM | WEIGHT: 163.9 LBS | TEMPERATURE: 96.9 F | HEIGHT: 64 IN | SYSTOLIC BLOOD PRESSURE: 98 MMHG | DIASTOLIC BLOOD PRESSURE: 68 MMHG | BODY MASS INDEX: 27.98 KG/M2 | OXYGEN SATURATION: 98 %

## 2023-08-09 DIAGNOSIS — K76.0 HEPATIC STEATOSIS: ICD-10-CM

## 2023-08-09 DIAGNOSIS — R74.8 ELEVATED ALKALINE PHOSPHATASE LEVEL: Primary | ICD-10-CM

## 2023-08-09 PROCEDURE — 99213 OFFICE O/P EST LOW 20 MIN: CPT | Performed by: INTERNAL MEDICINE

## 2023-08-09 RX ORDER — BLOOD-GLUCOSE SENSOR
EACH MISCELLANEOUS
COMMUNITY
Start: 2023-08-04

## 2023-08-09 NOTE — PROGRESS NOTES
"Chief Complaint   Patient presents with    Elevated alkaline phosphatase level     Subjective     HPI  Filomena Melendez is a 52 y.o. female who presents today for office follow up.  She has a history of chronic isolated elevation of alkaline phosphatase dating back to at least 2018.  Serologic workup has been negative.  Ultrasound of the liver has shown hepatic steatosis.  The remainder of her LFTs as mentioned are normal.  She has lost weight since her last visit earlier this year she has been working closely with her endocrinologist to regulate her thyroid levels.  She has no GI complaints.  Still has not completed colon cancer screening with Cologuard but says she will \"work on this\"    Objective   Vitals:    08/09/23 0801   BP: 98/68   Pulse: 96   Temp: 96.9 øF (36.1 øC)   SpO2: 98%       Physical Exam  Vitals reviewed.   Constitutional:       Appearance: She is well-developed.   HENT:      Head: Normocephalic and atraumatic.   Neurological:      Mental Status: She is alert and oriented to person, place, and time.   Psychiatric:         Behavior: Behavior normal.         Thought Content: Thought content normal.         Judgment: Judgment normal.     The following data was reviewed by: Jay Olsen MD on 08/09/2023:  Common labs          10/17/2022    13:46 11/10/2022    10:28 2/1/2023    07:50   Common Labs   Glucose   86    BUN   12    Creatinine   0.88    Sodium   143    Potassium   3.9    Chloride   106    Calcium   9.5    Total Protein   7.3    Albumin   4.2    Total Bilirubin   0.3    Alkaline Phosphatase 163   135    AST (SGOT)   26    ALT (SGPT)   30    WBC  8.32  6.96    Hemoglobin  12.7  12.0    Hematocrit  39.9  37.6    Platelets  325  297           Assessment & Plan   Assessment:     1. Elevated alkaline phosphatase level    2. Hepatic steatosis       Plan:   Chronic mild elevation of alkaline phosphatase felt to be likely related to some underlying hepatic steatosis.  Will update her LFTs today.  " Will check a Arellano FibroSure.  Continue with weight loss she has done an excellent job of this over the last 6 months.  Encouraged her to complete Cologuard testing for colorectal cancer screening she does not wish to proceed with colonoscopy at this time.          Jay Olsen M.D.  [facility]  [address] [place] [address]  Office: [phone]

## 2023-08-10 LAB
ALBUMIN SERPL-MCNC: 4.2 G/DL (ref 3.5–5.2)
ALBUMIN/GLOB SERPL: 1.5 G/DL
ALP SERPL-CCNC: 96 U/L (ref 39–117)
ALT SERPL-CCNC: 15 U/L (ref 1–33)
AST SERPL-CCNC: 20 U/L (ref 1–32)
BILIRUB SERPL-MCNC: 0.4 MG/DL (ref 0–1.2)
BUN SERPL-MCNC: 10 MG/DL (ref 6–20)
BUN/CREAT SERPL: 12 (ref 7–25)
CALCIUM SERPL-MCNC: 9.7 MG/DL (ref 8.6–10.5)
CHLORIDE SERPL-SCNC: 107 MMOL/L (ref 98–107)
CO2 SERPL-SCNC: 24.6 MMOL/L (ref 22–29)
CREAT SERPL-MCNC: 0.83 MG/DL (ref 0.57–1)
EGFRCR SERPLBLD CKD-EPI 2021: 85.5 ML/MIN/1.73
GLOBULIN SER CALC-MCNC: 2.8 GM/DL
GLUCOSE SERPL-MCNC: 83 MG/DL (ref 65–99)
POTASSIUM SERPL-SCNC: 4 MMOL/L (ref 3.5–5.2)
PROT SERPL-MCNC: 7 G/DL (ref 6–8.5)
SODIUM SERPL-SCNC: 141 MMOL/L (ref 136–145)

## 2023-08-11 LAB
A2 MACROGLOB SERPL-MCNC: 163 MG/DL (ref 110–276)
ALT SERPL W P-5'-P-CCNC: 16 IU/L (ref 0–40)
APO A-I SERPL-MCNC: 104 MG/DL (ref 116–209)
AST SERPL W P-5'-P-CCNC: 23 IU/L (ref 0–40)
BILIRUB SERPL-MCNC: 0.4 MG/DL (ref 0–1.2)
CHOLEST SERPL-MCNC: 138 MG/DL (ref 100–199)
FIBROSIS SCORING:: ABNORMAL
FIBROSIS STAGE SERPL QL: ABNORMAL
GGT SERPL-CCNC: 17 IU/L (ref 0–60)
GLUCOSE SERPL-MCNC: 82 MG/DL (ref 70–99)
HAPTOGLOB SERPL-MCNC: 111 MG/DL (ref 33–346)
LABORATORY COMMENT REPORT: ABNORMAL
LIVER FIBR SCORE SERPL CALC.FIBROSURE: 0.15 (ref 0–0.21)
LIVER STEATOSIS GRADE SERPL QL: ABNORMAL
LIVER STEATOSIS SCORE SERPL: 0.33 (ref 0–0.4)
NASH GRADE SERPL QL: ABNORMAL
NASH INTERPRETATION SERPL-IMP: ABNORMAL
NASH SCORE SERPL: 0 (ref 0–0.25)
NASH SCORING: ABNORMAL
STEATOSIS SCORING: ABNORMAL
TEST PERFORMANCE INFO SPEC: ABNORMAL
TEST PERFORMANCE INFO SPEC: ABNORMAL
TRIGL SERPL-MCNC: 75 MG/DL (ref 0–149)

## 2023-08-29 NOTE — PROGRESS NOTES
LFTs normal  No fatty deposition or scarring on fibrosure   All very reassuring  She can f/u with PCP and return to GI clinic as needed  Needs to complete cologuard

## 2024-01-05 ENCOUNTER — OFFICE VISIT (OUTPATIENT)
Dept: CARDIOLOGY | Facility: CLINIC | Age: 54
End: 2024-01-05
Payer: COMMERCIAL

## 2024-01-05 VITALS
DIASTOLIC BLOOD PRESSURE: 80 MMHG | SYSTOLIC BLOOD PRESSURE: 122 MMHG | HEART RATE: 64 BPM | HEIGHT: 64 IN | BODY MASS INDEX: 24.96 KG/M2 | WEIGHT: 146.2 LBS

## 2024-01-05 DIAGNOSIS — E03.9 HYPOTHYROIDISM (ACQUIRED): ICD-10-CM

## 2024-01-05 DIAGNOSIS — I10 ESSENTIAL (PRIMARY) HYPERTENSION: ICD-10-CM

## 2024-01-05 DIAGNOSIS — E78.5 HYPERLIPIDEMIA, UNSPECIFIED HYPERLIPIDEMIA TYPE: ICD-10-CM

## 2024-01-05 DIAGNOSIS — R00.2 PALPITATIONS: Primary | ICD-10-CM

## 2024-01-05 RX ORDER — LEVOTHYROXINE SODIUM 125 UG/1
125 CAPSULE ORAL DAILY
COMMUNITY
Start: 2023-08-10

## 2024-01-05 RX ORDER — NEBIVOLOL 2.5 MG/1
2.5 TABLET ORAL DAILY
Qty: 90 TABLET | Refills: 3 | Status: SHIPPED | OUTPATIENT
Start: 2024-01-05

## 2024-01-05 NOTE — PROGRESS NOTES
"    CARDIOLOGY        Patient Name: Filomena Melendez  :1970  Age: 53 y.o.  Primary Cardiologist: Moo Chow MD  Encounter Provider:  Palemr Woodall PA-C    Date of Service: 24        CHIEF COMPLAINT / REASON FOR OFFICE VISIT     Six month follow up    HISTORY OF PRESENT ILLNESS       HPI  Filomena Melendez is a 53 y.o. female who presents today for six month follow up.     Pt has a  history significant for hypertension, hyperlipidemia, diabetes mellitus, and palpitations presents to the office for 6-month follow-up.  Patient was last seen in office in  for palpitations.  Patient had a 48-hour monitor that was predominant sinus rhythm with some rare ventricular ectopy.  Patient also had a sleep study that did not show sleep apnea.  Patient was started on labetalol and her symptoms improved.    Patient since she has been doing well since last visit.  Patient still having alerts on her Apple Watch that her heart rate would get into the 110's.  Patient does not feel any palpitations.  She will be alerted daily by her watch.  She relates increased heart rate due to stress with work.  No excessive caffeine use.  Patient denies any chest pain, shortness of breath, palpitations, lightheadedness, or edema to her legs.  Patient has improved on weight loss and diet.     The following portions of the patient's history were reviewed and updated as appropriate: allergies, current medications, past family history, past medical history, past social history, past surgical history and problem list.      VITAL SIGNS     Visit Vitals  /80 (BP Location: Left arm, Patient Position: Sitting)   Pulse 64   Ht 162.6 cm (64.02\")   Wt 66.3 kg (146 lb 3.2 oz)   BMI 25.08 kg/m²         Wt Readings from Last 3 Encounters:   24 66.3 kg (146 lb 3.2 oz)   23 74.3 kg (163 lb 14.4 oz)   23 78 kg (172 lb)     Body mass index is 25.08 kg/m².        PHYSICAL EXAMINATION     Constitutional:       General: Awake. Not in " acute distress.     Appearance: Healthy appearance. Not in distress.   Pulmonary:      Effort: Pulmonary effort is normal.      Breath sounds: Normal breath sounds.   Cardiovascular:      Normal rate. Regular rhythm.      Murmurs: There is no murmur.   Pulses:     Intact distal pulses.   Edema:     Peripheral edema absent.   Skin:     General: Skin is warm.   Neurological:      Mental Status: Alert.   Psychiatric:         Behavior: Behavior is cooperative.           REVIEWED DATA       ECG 12 Lead    Date/Time: 1/5/2024 9:00 AM  Performed by: Palmer Woodall PA-C    Authorized by: Palmer Woodall PA-C  Comparison: compared with previous ECG   Similar to previous ECG  Rhythm: sinus rhythm  Rate: normal  T flattening: V3  QRS axis: normal    Clinical impression: non-specific ECG  Comments: No significant change from previous          Cardiac Procedures:      Transthoracic echo on 3/17/2023  Interpretation Summary         Left ventricular systolic function is normal. Calculated left ventricular EF = 61.5%    Left ventricular diastolic function was normal.    Normal echo     Holter monitor on 1/4/2023  Interpretation Summary         A normal monitor study.    48-hour Holter monitor predominant rhythm sinus heart rate ranging 75 to 149 bpm (average 92 beats minute). Rare supraventricular ectopy with no SVT. Rare ventricular ectopy with no NSVT. No atrial fibrillation or flutter was noted. There were no patient triggered or diary events reported.       Lipid Panel          8/9/2023    08:13   Lipid Panel   Triglycerides 75        Lab Results   Component Value Date     08/09/2023     02/01/2023    K 4.0 08/09/2023    K 3.9 02/01/2023     08/09/2023     02/01/2023    CO2 24.6 08/09/2023    CO2 30.5 (H) 02/01/2023    BUN 10 08/09/2023    BUN 12 02/01/2023    CREATININE 0.83 08/09/2023    CREATININE 0.88 02/01/2023    EGFRIFNONA 75 10/13/2021    EGFRIFNONA 81 09/23/2021    EGFRIFAFRI 91  "10/13/2021    EGFRIFAFRI 98 09/23/2021    GLUCOSE 83 08/09/2023    GLUCOSE 86 02/01/2023    CALCIUM 9.7 08/09/2023    CALCIUM 9.5 02/01/2023    PROTENTOTREF 7.0 08/09/2023    PROTENTOTREF 7.3 02/01/2023    ALBUMIN 4.2 08/09/2023    ALBUMIN 4.2 02/01/2023    BILITOT 0.4 08/09/2023    BILITOT 0.3 02/01/2023    AST 20 08/09/2023    AST 26 02/01/2023    ALT 15 08/09/2023    ALT 30 02/01/2023     Lab Results   Component Value Date    WBC 6.96 02/01/2023    WBC 8.32 11/10/2022    HGB 12.0 02/01/2023    HGB 12.7 11/10/2022    HCT 37.6 02/01/2023    HCT 39.9 11/10/2022    MCV 84.3 02/01/2023    MCV 83.8 11/10/2022     02/01/2023     11/10/2022     No results found for: \"PROBNP\", \"BNP\"  No results found for: \"CKTOTAL\", \"CKMB\", \"CKMBINDEX\", \"TROPONINI\", \"TROPONINT\"  Lab Results   Component Value Date    TSH 3.970 12/13/2022    TSH 6.440 (H) 11/10/2022             ASSESSMENT & PLAN     Diagnoses and all orders for this visit:    1. Palpitations (Primary)   Improved on Bystolic.  Patient was reduced from 5 mg to 2.5 by PCP due to low blood pressure.  Patient has been doing well on it.  Refill prescription today.  I discussed her Holter monitor/echo with her as well.    2. Essential (primary) hypertension   As above.  Improved with Bystolic.    3.  Hypothyroidism   On Levothyroxine.    4. Diabetes Mellitus   Followed by PCP    5. Hyperlipidemia   LDL of 110 per labs on 7/27/2022.  Patient encouraged on lifestyle modifications PCP.  I spoke to patient about this as well.  Will repeat lipid profile and may need to start patient on statin therapy at next visit      Patient to follow-up with Dr. Chow in 6 months or sooner if any issues.     Future Appointments         Provider Department Center    7/11/2024 9:15 AM Moo Chow Jr., MD [facility] CARDIOLOGY [facility]                MEDICATIONS         Discharge Medications            Accurate as of January 5, 2024  9:19 AM. If you have any questions, " ask your nurse or doctor.                Changes to Medications        Instructions Start Date   nebivolol 2.5 MG tablet  Commonly known as: BYSTOLIC  What changed:   medication strength  how much to take  Changed by: Palmer Woodall PA-C   2.5 mg, Oral, Daily             Continue These Medications        Instructions Start Date   empagliflozin 25 MG tablet tablet  Commonly known as: JARDIANCE   Oral, Daily      estradiol 0.05 MG/24HR patch  Commonly known as: THOMAS MAURER-DOT       FreeStyle Minh 3 Sensor misc       levothyroxine sodium 150 MCG capsule  Commonly known as: TIROSINT   150 mcg, Oral, Daily      Tirosint 125 MCG capsule capsule  Generic drug: levothyroxine sodium   125 mcg, Oral, Daily      Mounjaro 7.5 MG/0.5ML solution pen-injector pen  Generic drug: Tirzepatide       Progesterone 200 MG capsule  Commonly known as: PROMETRIUM   200 mg, Oral, Daily      vitamin D 1.25 MG (08474 UT) capsule capsule  Commonly known as: ERGOCALCIFEROL   No dose, route, or frequency recorded.      Vyvanse 50 MG capsule  Generic drug: lisdexamfetamine                    **Dragon Disclaimer:   Much of this encounter note is an electronic transcription/translation of spoken language to printed text. The electronic translation of spoken language may permit erroneous, or at times, nonsensical words or phrases to be inadvertently transcribed. Although I have reviewed the note for such errors, some may still exist.

## 2024-07-11 ENCOUNTER — OFFICE VISIT (OUTPATIENT)
Dept: CARDIOLOGY | Facility: CLINIC | Age: 54
End: 2024-07-11
Payer: COMMERCIAL

## 2024-07-11 VITALS
HEART RATE: 88 BPM | SYSTOLIC BLOOD PRESSURE: 112 MMHG | DIASTOLIC BLOOD PRESSURE: 78 MMHG | WEIGHT: 144.4 LBS | HEIGHT: 64 IN | OXYGEN SATURATION: 98 % | BODY MASS INDEX: 24.65 KG/M2

## 2024-07-11 DIAGNOSIS — E03.9 HYPOTHYROIDISM (ACQUIRED): ICD-10-CM

## 2024-07-11 DIAGNOSIS — I10 ESSENTIAL (PRIMARY) HYPERTENSION: ICD-10-CM

## 2024-07-11 DIAGNOSIS — R00.2 PALPITATIONS: Primary | ICD-10-CM

## 2024-07-11 PROCEDURE — 99213 OFFICE O/P EST LOW 20 MIN: CPT | Performed by: INTERNAL MEDICINE

## 2024-07-11 RX ORDER — FLUTICASONE PROPIONATE 50 MCG
SPRAY, SUSPENSION (ML) NASAL
COMMUNITY
Start: 2024-05-10

## 2024-07-11 NOTE — PROGRESS NOTES
[facility]      Patient Name: Filomena Melendez  :1970  Age: 53 y.o.  Encounter Provider:  Moo Chow Jr, MD      Chief Complaint:   Chief Complaint   Patient presents with    Palpitations         Palpitations   Associated symptoms include malaise/fatigue. Pertinent negatives include no chest pain, coughing, dizziness, fever, near-syncope or syncope.   Filomena Melendez is a 53 y.o. female recently diagnosed hypothyroidism and hypertension presents for follow-up evaluation of palpitations.     Last clinic visit note: Patient has been noted to have a high resting heart rate at PCP office.  She is uncertain as to the chronicity of this finding.  She does note more palpitations but no dizziness or syncope.  She is noted to have a PVC on EKG today in clinic.  She is limited activity due to recent problems with her ankle but she normally tries to walk on the treadmill at 2 to 3 mph for about 30 minutes.  With that activity she has no chest pain or shortness of air.  No orthopnea, PND or edema.  She does admit to snoring and hypersomnia.  She was just recently diagnosed with hypertension and started on medications.  No family history of premature coronary artery disease or sudden cardiac death.  She is a lifelong non-smoker denies alcohol or illicit drug use.    No sustained arrhythmias on Holter monitor.  She was ultimately diagnosed with Hashimoto thyroiditis and after getting the thyroid function under control she is lost a significant amount of weight.  She feels great.  No chest pain or shortness of air with activity.  No orthopnea, PND or edema.  Negative sleep study.  Blood pressure and heart rate are well-controlled today in clinic.    The following portions of the patient's history were reviewed and updated as appropriate: allergies, current medications, past family history, past medical history, past social history, past surgical history and problem list.      Review of Systems  "  Constitutional: Positive for malaise/fatigue. Negative for chills and fever.   HENT:  Negative for hoarse voice and sore throat.    Eyes:  Negative for double vision and photophobia.   Cardiovascular:  Positive for palpitations. Negative for chest pain, leg swelling, near-syncope, orthopnea, paroxysmal nocturnal dyspnea and syncope.   Respiratory:  Negative for cough and wheezing.    Skin:  Negative for poor wound healing and rash.   Musculoskeletal:  Negative for arthritis and joint swelling.   Gastrointestinal:  Negative for bloating, abdominal pain, hematemesis and hematochezia.   Neurological:  Negative for dizziness and focal weakness.   Psychiatric/Behavioral:  Negative for depression and suicidal ideas.        OBJECTIVE:   Vital Signs  Vitals:    07/11/24 0937   BP: 112/78   Pulse: 88   SpO2: 98%     Estimated body mass index is 24.79 kg/m² as calculated from the following:    Height as of this encounter: 162.6 cm (64\").    Weight as of this encounter: 65.5 kg (144 lb 6.4 oz).    Vitals reviewed.   Constitutional:       Appearance: Healthy appearance. Not in distress.   Neck:      Vascular: No JVR. JVD normal.   Pulmonary:      Effort: Pulmonary effort is normal.      Breath sounds: Normal breath sounds. No wheezing. No rhonchi. No rales.   Chest:      Chest wall: Not tender to palpatation.   Cardiovascular:      PMI at left midclavicular line. Normal rate. Regular rhythm. Normal S1. Normal S2.       Murmurs: There is no murmur.      No gallop.  No click. No rub.   Pulses:     Intact distal pulses.   Edema:     Peripheral edema absent.   Abdominal:      General: Bowel sounds are normal.      Palpations: Abdomen is soft.      Tenderness: There is no abdominal tenderness.   Musculoskeletal: Normal range of motion.         General: No tenderness. Skin:     General: Skin is warm and dry.   Neurological:      General: No focal deficit present.      Mental Status: Alert and oriented to person, place and time. "       Procedures            ASSESSMENT:     52-year-old female with recently diagnosed hypertension and hypothyroidism presents for evaluation of palpitation    PLAN OF CARE:     Palpitations -no significant ectopic burden or sustained arrhythmias on Holter.  She is asymptomatic at this time after weight loss.  Continue same.  Hypersomnia -negative sleep study.  She is lost significant amount of weight.  She feels well.  Hypertension -seemingly well controlled at this time.  Continue salt restricted diet.  Hypothyroidism    No cardiac pathology on testing.  I will see the patient as needed.  Please call with any questions or concerns.             Discharge Medications            Accurate as of July 11, 2024  9:39 AM. If you have any questions, ask your nurse or doctor.                Changes to Medications        Instructions Start Date   nebivolol 2.5 MG tablet  Commonly known as: BYSTOLIC  What changed: how much to take   2.5 mg, Oral, Daily             Continue These Medications        Instructions Start Date   estradiol 0.05 MG/24HR patch  Commonly known as: MINIVELTHOMAS ROWLAND-DOT       fluticasone 50 MCG/ACT nasal spray  Commonly known as: FLONASE       levothyroxine sodium 150 MCG capsule  Commonly known as: TIROSINT   150 mcg, Daily      Tirosint 125 MCG capsule capsule  Generic drug: levothyroxine sodium   125 mcg, Oral, Daily      lisdexamfetamine 70 MG capsule  Commonly known as: VYVANSE   Take 1 capsule by mouth Every Morning      Progesterone 200 MG capsule  Commonly known as: PROMETRIUM   200 mg, Oral, Daily      vitamin D 1.25 MG (03490 UT) capsule capsule  Commonly known as: ERGOCALCIFEROL   No dose, route, or frequency recorded.               Thank you for allowing me to participate in the care of your patient,      Sincerely,   Moo Chow MD  [facility]  07/11/24  09:39 EDT

## 2024-10-18 ENCOUNTER — FLU SHOT (OUTPATIENT)
Dept: FAMILY MEDICINE CLINIC | Facility: CLINIC | Age: 54
End: 2024-10-18
Payer: COMMERCIAL

## 2024-10-18 DIAGNOSIS — Z23 NEED FOR VACCINATION: Primary | ICD-10-CM

## 2024-10-18 PROCEDURE — 90656 IIV3 VACC NO PRSV 0.5 ML IM: CPT | Performed by: FAMILY MEDICINE

## 2024-10-18 PROCEDURE — 90471 IMMUNIZATION ADMIN: CPT | Performed by: FAMILY MEDICINE
